# Patient Record
Sex: MALE | Race: WHITE | NOT HISPANIC OR LATINO | URBAN - METROPOLITAN AREA
[De-identification: names, ages, dates, MRNs, and addresses within clinical notes are randomized per-mention and may not be internally consistent; named-entity substitution may affect disease eponyms.]

---

## 2020-01-16 ENCOUNTER — INPATIENT (INPATIENT)
Facility: HOSPITAL | Age: 60
LOS: 5 days | Discharge: HOME | DRG: 872 | End: 2020-01-22
Attending: FAMILY MEDICINE | Admitting: FAMILY MEDICINE
Payer: COMMERCIAL

## 2020-01-16 VITALS
WEIGHT: 220.02 LBS | DIASTOLIC BLOOD PRESSURE: 76 MMHG | HEART RATE: 103 BPM | TEMPERATURE: 100 F | RESPIRATION RATE: 18 BRPM | SYSTOLIC BLOOD PRESSURE: 132 MMHG | OXYGEN SATURATION: 96 %

## 2020-01-16 DIAGNOSIS — M54.9 DORSALGIA, UNSPECIFIED: ICD-10-CM

## 2020-01-16 DIAGNOSIS — Z29.9 ENCOUNTER FOR PROPHYLACTIC MEASURES, UNSPECIFIED: ICD-10-CM

## 2020-01-16 DIAGNOSIS — R50.9 FEVER, UNSPECIFIED: ICD-10-CM

## 2020-01-16 LAB
ALBUMIN SERPL ELPH-MCNC: 4 G/DL — SIGNIFICANT CHANGE UP (ref 3.3–5)
ALP SERPL-CCNC: 70 U/L — SIGNIFICANT CHANGE UP (ref 40–120)
ALT FLD-CCNC: 34 U/L — SIGNIFICANT CHANGE UP (ref 12–78)
ANION GAP SERPL CALC-SCNC: 8 MMOL/L — SIGNIFICANT CHANGE UP (ref 5–17)
APPEARANCE UR: CLEAR — SIGNIFICANT CHANGE UP
AST SERPL-CCNC: 28 U/L — SIGNIFICANT CHANGE UP (ref 15–37)
BASOPHILS # BLD AUTO: 0 K/UL — SIGNIFICANT CHANGE UP (ref 0–0.2)
BASOPHILS NFR BLD AUTO: 0 % — SIGNIFICANT CHANGE UP (ref 0–2)
BILIRUB SERPL-MCNC: 1.3 MG/DL — HIGH (ref 0.2–1.2)
BILIRUB UR-MCNC: NEGATIVE — SIGNIFICANT CHANGE UP
BUN SERPL-MCNC: 13 MG/DL — SIGNIFICANT CHANGE UP (ref 7–23)
CALCIUM SERPL-MCNC: 9.8 MG/DL — SIGNIFICANT CHANGE UP (ref 8.5–10.1)
CHLORIDE SERPL-SCNC: 101 MMOL/L — SIGNIFICANT CHANGE UP (ref 96–108)
CO2 SERPL-SCNC: 27 MMOL/L — SIGNIFICANT CHANGE UP (ref 22–31)
COLOR SPEC: YELLOW — SIGNIFICANT CHANGE UP
CREAT SERPL-MCNC: 1.1 MG/DL — SIGNIFICANT CHANGE UP (ref 0.5–1.3)
DIFF PNL FLD: ABNORMAL
EOSINOPHIL # BLD AUTO: 0 K/UL — SIGNIFICANT CHANGE UP (ref 0–0.5)
EOSINOPHIL NFR BLD AUTO: 0 % — SIGNIFICANT CHANGE UP (ref 0–6)
GLUCOSE SERPL-MCNC: 109 MG/DL — HIGH (ref 70–99)
GLUCOSE UR QL: NEGATIVE — SIGNIFICANT CHANGE UP
HCT VFR BLD CALC: 42 % — SIGNIFICANT CHANGE UP (ref 39–50)
HGB BLD-MCNC: 14.2 G/DL — SIGNIFICANT CHANGE UP (ref 13–17)
KETONES UR-MCNC: ABNORMAL
LACTATE SERPL-SCNC: 0.9 MMOL/L — SIGNIFICANT CHANGE UP (ref 0.7–2)
LEUKOCYTE ESTERASE UR-ACNC: NEGATIVE — SIGNIFICANT CHANGE UP
LIDOCAIN IGE QN: 92 U/L — SIGNIFICANT CHANGE UP (ref 73–393)
LYMPHOCYTES # BLD AUTO: 1.17 K/UL — SIGNIFICANT CHANGE UP (ref 1–3.3)
LYMPHOCYTES # BLD AUTO: 9 % — LOW (ref 13–44)
MCHC RBC-ENTMCNC: 29.3 PG — SIGNIFICANT CHANGE UP (ref 27–34)
MCHC RBC-ENTMCNC: 33.8 GM/DL — SIGNIFICANT CHANGE UP (ref 32–36)
MCV RBC AUTO: 86.6 FL — SIGNIFICANT CHANGE UP (ref 80–100)
MONOCYTES # BLD AUTO: 1.69 K/UL — HIGH (ref 0–0.9)
MONOCYTES NFR BLD AUTO: 13 % — SIGNIFICANT CHANGE UP (ref 2–14)
NEUTROPHILS # BLD AUTO: 9.48 K/UL — HIGH (ref 1.8–7.4)
NEUTROPHILS NFR BLD AUTO: 65 % — SIGNIFICANT CHANGE UP (ref 43–77)
NITRITE UR-MCNC: NEGATIVE — SIGNIFICANT CHANGE UP
NRBC # BLD: SIGNIFICANT CHANGE UP /100 WBCS (ref 0–0)
PH UR: 5 — SIGNIFICANT CHANGE UP (ref 5–8)
PLATELET # BLD AUTO: 208 K/UL — SIGNIFICANT CHANGE UP (ref 150–400)
POTASSIUM SERPL-MCNC: 4.2 MMOL/L — SIGNIFICANT CHANGE UP (ref 3.5–5.3)
POTASSIUM SERPL-SCNC: 4.2 MMOL/L — SIGNIFICANT CHANGE UP (ref 3.5–5.3)
PROT SERPL-MCNC: 8.4 G/DL — HIGH (ref 6–8.3)
PROT UR-MCNC: 30 MG/DL
RBC # BLD: 4.85 M/UL — SIGNIFICANT CHANGE UP (ref 4.2–5.8)
RBC # FLD: 12.6 % — SIGNIFICANT CHANGE UP (ref 10.3–14.5)
SODIUM SERPL-SCNC: 136 MMOL/L — SIGNIFICANT CHANGE UP (ref 135–145)
SP GR SPEC: 1 — LOW (ref 1.01–1.02)
UROBILINOGEN FLD QL: NEGATIVE — SIGNIFICANT CHANGE UP
WBC # BLD: 12.98 K/UL — HIGH (ref 3.8–10.5)
WBC # FLD AUTO: 12.98 K/UL — HIGH (ref 3.8–10.5)

## 2020-01-16 PROCEDURE — 74177 CT ABD & PELVIS W/CONTRAST: CPT | Mod: 26

## 2020-01-16 PROCEDURE — 99285 EMERGENCY DEPT VISIT HI MDM: CPT

## 2020-01-16 RX ORDER — SODIUM CHLORIDE 9 MG/ML
1000 INJECTION INTRAMUSCULAR; INTRAVENOUS; SUBCUTANEOUS ONCE
Refills: 0 | Status: COMPLETED | OUTPATIENT
Start: 2020-01-16 | End: 2020-01-16

## 2020-01-16 RX ORDER — ACETAMINOPHEN 500 MG
650 TABLET ORAL EVERY 6 HOURS
Refills: 0 | Status: DISCONTINUED | OUTPATIENT
Start: 2020-01-16 | End: 2020-01-22

## 2020-01-16 RX ORDER — TELMISARTAN 20 MG/1
1 TABLET ORAL
Qty: 0 | Refills: 0 | DISCHARGE

## 2020-01-16 RX ORDER — VANCOMYCIN HCL 1 G
1000 VIAL (EA) INTRAVENOUS EVERY 12 HOURS
Refills: 0 | Status: DISCONTINUED | OUTPATIENT
Start: 2020-01-16 | End: 2020-01-16

## 2020-01-16 RX ORDER — MORPHINE SULFATE 50 MG/1
2 CAPSULE, EXTENDED RELEASE ORAL EVERY 6 HOURS
Refills: 0 | Status: DISCONTINUED | OUTPATIENT
Start: 2020-01-16 | End: 2020-01-20

## 2020-01-16 RX ORDER — TRAMADOL HYDROCHLORIDE 50 MG/1
25 TABLET ORAL EVERY 6 HOURS
Refills: 0 | Status: DISCONTINUED | OUTPATIENT
Start: 2020-01-16 | End: 2020-01-20

## 2020-01-16 RX ORDER — LIDOCAINE 4 G/100G
1 CREAM TOPICAL EVERY 24 HOURS
Refills: 0 | Status: DISCONTINUED | OUTPATIENT
Start: 2020-01-16 | End: 2020-01-22

## 2020-01-16 RX ORDER — HEPARIN SODIUM 5000 [USP'U]/ML
5000 INJECTION INTRAVENOUS; SUBCUTANEOUS EVERY 12 HOURS
Refills: 0 | Status: DISCONTINUED | OUTPATIENT
Start: 2020-01-16 | End: 2020-01-16

## 2020-01-16 RX ORDER — OXYCODONE HYDROCHLORIDE 5 MG/1
5 TABLET ORAL EVERY 6 HOURS
Refills: 0 | Status: DISCONTINUED | OUTPATIENT
Start: 2020-01-16 | End: 2020-01-20

## 2020-01-16 RX ORDER — SIMVASTATIN 20 MG/1
20 TABLET, FILM COATED ORAL AT BEDTIME
Refills: 0 | Status: DISCONTINUED | OUTPATIENT
Start: 2020-01-16 | End: 2020-01-21

## 2020-01-16 RX ORDER — HEPARIN SODIUM 5000 [USP'U]/ML
5000 INJECTION INTRAVENOUS; SUBCUTANEOUS EVERY 8 HOURS
Refills: 0 | Status: DISCONTINUED | OUTPATIENT
Start: 2020-01-16 | End: 2020-01-22

## 2020-01-16 RX ORDER — ONDANSETRON 8 MG/1
4 TABLET, FILM COATED ORAL ONCE
Refills: 0 | Status: COMPLETED | OUTPATIENT
Start: 2020-01-16 | End: 2020-01-16

## 2020-01-16 RX ORDER — MORPHINE SULFATE 50 MG/1
4 CAPSULE, EXTENDED RELEASE ORAL ONCE
Refills: 0 | Status: DISCONTINUED | OUTPATIENT
Start: 2020-01-16 | End: 2020-01-16

## 2020-01-16 RX ORDER — VANCOMYCIN HCL 1 G
1500 VIAL (EA) INTRAVENOUS EVERY 12 HOURS
Refills: 0 | Status: DISCONTINUED | OUTPATIENT
Start: 2020-01-16 | End: 2020-01-18

## 2020-01-16 RX ORDER — SIMVASTATIN 20 MG/1
0 TABLET, FILM COATED ORAL
Qty: 0 | Refills: 0 | DISCHARGE

## 2020-01-16 RX ORDER — METOPROLOL TARTRATE 50 MG
0 TABLET ORAL
Qty: 0 | Refills: 0 | DISCHARGE

## 2020-01-16 RX ORDER — KETOROLAC TROMETHAMINE 30 MG/ML
15 SYRINGE (ML) INJECTION ONCE
Refills: 0 | Status: DISCONTINUED | OUTPATIENT
Start: 2020-01-16 | End: 2020-01-16

## 2020-01-16 RX ORDER — ATORVASTATIN CALCIUM 80 MG/1
1 TABLET, FILM COATED ORAL
Qty: 0 | Refills: 0 | DISCHARGE

## 2020-01-16 RX ORDER — LACTOBACILLUS ACIDOPHILUS 100MM CELL
1 CAPSULE ORAL THREE TIMES A DAY
Refills: 0 | Status: DISCONTINUED | OUTPATIENT
Start: 2020-01-16 | End: 2020-01-22

## 2020-01-16 RX ORDER — CIPROFLOXACIN LACTATE 400MG/40ML
400 VIAL (ML) INTRAVENOUS ONCE
Refills: 0 | Status: COMPLETED | OUTPATIENT
Start: 2020-01-16 | End: 2020-01-16

## 2020-01-16 RX ORDER — CYCLOBENZAPRINE HYDROCHLORIDE 10 MG/1
5 TABLET, FILM COATED ORAL THREE TIMES A DAY
Refills: 0 | Status: DISCONTINUED | OUTPATIENT
Start: 2020-01-16 | End: 2020-01-21

## 2020-01-16 RX ORDER — METOPROLOL TARTRATE 50 MG
25 TABLET ORAL DAILY
Refills: 0 | Status: DISCONTINUED | OUTPATIENT
Start: 2020-01-16 | End: 2020-01-17

## 2020-01-16 RX ORDER — METOPROLOL TARTRATE 50 MG
1 TABLET ORAL
Qty: 0 | Refills: 0 | DISCHARGE

## 2020-01-16 RX ADMIN — SIMVASTATIN 20 MILLIGRAM(S): 20 TABLET, FILM COATED ORAL at 23:12

## 2020-01-16 RX ADMIN — MORPHINE SULFATE 4 MILLIGRAM(S): 50 CAPSULE, EXTENDED RELEASE ORAL at 17:45

## 2020-01-16 RX ADMIN — Medication 15 MILLIGRAM(S): at 16:34

## 2020-01-16 RX ADMIN — Medication 15 MILLIGRAM(S): at 15:14

## 2020-01-16 RX ADMIN — HEPARIN SODIUM 5000 UNIT(S): 5000 INJECTION INTRAVENOUS; SUBCUTANEOUS at 23:12

## 2020-01-16 RX ADMIN — MORPHINE SULFATE 4 MILLIGRAM(S): 50 CAPSULE, EXTENDED RELEASE ORAL at 17:59

## 2020-01-16 RX ADMIN — SODIUM CHLORIDE 1000 MILLILITER(S): 9 INJECTION INTRAMUSCULAR; INTRAVENOUS; SUBCUTANEOUS at 16:34

## 2020-01-16 RX ADMIN — Medication 1 TABLET(S): at 23:12

## 2020-01-16 RX ADMIN — MORPHINE SULFATE 4 MILLIGRAM(S): 50 CAPSULE, EXTENDED RELEASE ORAL at 16:42

## 2020-01-16 RX ADMIN — Medication 200 MILLIGRAM(S): at 17:59

## 2020-01-16 RX ADMIN — CYCLOBENZAPRINE HYDROCHLORIDE 5 MILLIGRAM(S): 10 TABLET, FILM COATED ORAL at 20:20

## 2020-01-16 RX ADMIN — Medication 400 MILLIGRAM(S): at 19:00

## 2020-01-16 RX ADMIN — LIDOCAINE 1 PATCH: 4 CREAM TOPICAL at 20:21

## 2020-01-16 RX ADMIN — SODIUM CHLORIDE 1000 MILLILITER(S): 9 INJECTION INTRAMUSCULAR; INTRAVENOUS; SUBCUTANEOUS at 15:15

## 2020-01-16 RX ADMIN — ONDANSETRON 4 MILLIGRAM(S): 8 TABLET, FILM COATED ORAL at 15:14

## 2020-01-16 NOTE — ED ADULT NURSE NOTE - OBJECTIVE STATEMENT
This is a 58 y/o male received ambulatory AXO&4 c/o left sided plan associated with  urinary hesitancy and dysuria. He denies any fever, chills, vomiting, diarrhea. Abdomen soft nontender nondistended. Plan of care explained to patient will monitor support and safety maintained.

## 2020-01-16 NOTE — ED PROVIDER NOTE - CLINICAL SUMMARY MEDICAL DECISION MAKING FREE TEXT BOX
low back pain with fever and hematuria, CT low back pain with fever and hematuria, CT shows enlarged prostate and L4/L5 disc herniation, complicated by fever, will need MRI to r/o discitis

## 2020-01-16 NOTE — ED ADULT NURSE NOTE - PMH
Hyperlipidemia (ICD9 272.4)    Obesity (ICD9 278.00)    Rotator Cuff Syndrome of Shoulder and Allied Disorders (ICD9 726.10)

## 2020-01-16 NOTE — H&P ADULT - NSICDXPASTMEDICALHX_GEN_ALL_CORE_FT
PAST MEDICAL HISTORY:  Hyperlipidemia (ICD9 272.4)     Obesity (ICD9 278.00)     Rotator Cuff Syndrome of Shoulder and Allied Disorders (ICD9 726.10)

## 2020-01-16 NOTE — ED PROVIDER NOTE - OBJECTIVE STATEMENT
pt with low back pain for last 2 days, pain with movement, saw PMD today, noted that he had a fever and blood in the urine, sent pt in for further evaluation.  pt denies any n/v/d, denies any abdominal pain.  reports only very low (sacral) midline back pain.

## 2020-01-16 NOTE — H&P ADULT - NSHPLABSRESULTS_GEN_ALL_CORE
136  |  101  |  13  ----------------------------<  109<H>  4.2   |  27  |  1.10    Ca    9.8      2020 15:28    TPro  8.4<H>  /  Alb  4.0  /  TBili  1.3<H>  /  DBili  x   /  AST  28  /  ALT  34  /  AlkPhos  70                              14.2   12.98 )-----------( 208      ( 2020 15:28 )             42.0             LIVER FUNCTIONS - ( 2020 15:28 )  Alb: 4.0 g/dL / Pro: 8.4 g/dL / ALK PHOS: 70 U/L / ALT: 34 U/L / AST: 28 U/L / GGT: x                 Urinalysis Basic - ( 2020 18:08 )    Color: Yellow / Appearance: Clear / S.005 / pH: x  Gluc: x / Ketone: Trace  / Bili: Negative / Urobili: Negative   Blood: x / Protein: 30 mg/dL / Nitrite: Negative   Leuk Esterase: Negative / RBC: 3-5 /HPF / WBC 0-2   Sq Epi: x / Non Sq Epi: Few / Bacteria: Occasional

## 2020-01-16 NOTE — H&P ADULT - NSHPPHYSICALEXAM_GEN_ALL_CORE
Physical Examination: Gen:  alert, awake, no acute distress  	HEENT:  atraumatic head, airway clear, pupils equal and round  	CV:  rrr, nl S1, S2, no m/r/g  	Pulm:  lungs CTA b/l  	Abd: s/nt/nd, +BS  	Ext:  moving all extremities  	Neuro:  grossly intact, no focal deficits  	Spine: no spinal tenderness  	Skin:  clear, dry, intact  Psych: AOx3, normal affect, no apparent risk to self or others

## 2020-01-16 NOTE — PHARMACOTHERAPY INTERVENTION NOTE - COMMENTS
Patient ordered for heparin 5000 units SQ Q12 hours for dvt ppx and vancomycin 1g Q12 hours. Patient weighs ~100kg and CrCl >50. Recommended increasing heparin to Q8 hours and vancomycin to 1500mg Q12 hours; provider agreed.

## 2020-01-16 NOTE — ED PROVIDER NOTE - PHYSICAL EXAMINATION
Gen:  alert, awake, no acute distress  HEENT:  atraumatic head, airway clear, pupils equal and round  CV:  rrr, nl S1, S2, no m/r/g  Pulm:  lungs CTA b/l  Abd: s/nt/nd, +BS  Ext:  moving all extremities  Neuro:  grossly intact, no focal deficits  Spine: no spinal tenderness  Skin:  clear, dry, intact  Psych: AOx3, normal affect, no apparent risk to self or others

## 2020-01-16 NOTE — ED ADULT TRIAGE NOTE - CHIEF COMPLAINT QUOTE
Pt went to his PCP with  complaints of back/flank pain, and urinary hesitancy and dysuria, the PCP did a urine test which showed blood in the urine and they want him worked up here for a possible kidney stone.

## 2020-01-16 NOTE — H&P ADULT - HISTORY OF PRESENT ILLNESS
pt with low back pain for last 2 days, pain with movement, saw PMD today, noted that he had a fever and blood in the urine, sent pt in for further evaluation.  pt denies any n/v/d, denies any abdominal pain.  reports only very low (sacral) midline back pain.  In ER patient was found to have disc herniation.  Patient is being admitted for further work up and treatment.

## 2020-01-16 NOTE — ED PROVIDER NOTE - CARE PLAN
Principal Discharge DX:	Back pain  Secondary Diagnosis:	Fever  Secondary Diagnosis:	Prostatic disease

## 2020-01-17 DIAGNOSIS — R78.81 BACTEREMIA: ICD-10-CM

## 2020-01-17 DIAGNOSIS — M86.10 OTHER ACUTE OSTEOMYELITIS, UNSPECIFIED SITE: ICD-10-CM

## 2020-01-17 LAB
-  STREPTOCOCCUS SP. (NOT GRP A, B OR S PNEUMONIAE): SIGNIFICANT CHANGE UP
CULTURE RESULTS: NO GROWTH — SIGNIFICANT CHANGE UP
FLU A RESULT: SIGNIFICANT CHANGE UP
FLU A RESULT: SIGNIFICANT CHANGE UP
FLUAV AG NPH QL: SIGNIFICANT CHANGE UP
FLUBV AG NPH QL: SIGNIFICANT CHANGE UP
GRAM STN FLD: SIGNIFICANT CHANGE UP
HBA1C BLD-MCNC: 6.1 % — HIGH (ref 4–5.6)
HCT VFR BLD CALC: 39.5 % — SIGNIFICANT CHANGE UP (ref 39–50)
HCV AB S/CO SERPL IA: 0.12 S/CO — SIGNIFICANT CHANGE UP (ref 0–0.99)
HCV AB SERPL-IMP: SIGNIFICANT CHANGE UP
HGB BLD-MCNC: 13.1 G/DL — SIGNIFICANT CHANGE UP (ref 13–17)
MCHC RBC-ENTMCNC: 28.9 PG — SIGNIFICANT CHANGE UP (ref 27–34)
MCHC RBC-ENTMCNC: 33.2 GM/DL — SIGNIFICANT CHANGE UP (ref 32–36)
MCV RBC AUTO: 87.2 FL — SIGNIFICANT CHANGE UP (ref 80–100)
METHOD TYPE: SIGNIFICANT CHANGE UP
NRBC # BLD: 0 /100 WBCS — SIGNIFICANT CHANGE UP (ref 0–0)
PLATELET # BLD AUTO: 180 K/UL — SIGNIFICANT CHANGE UP (ref 150–400)
RBC # BLD: 4.53 M/UL — SIGNIFICANT CHANGE UP (ref 4.2–5.8)
RBC # FLD: 12.6 % — SIGNIFICANT CHANGE UP (ref 10.3–14.5)
RSV RESULT: SIGNIFICANT CHANGE UP
RSV RNA RESP QL NAA+PROBE: SIGNIFICANT CHANGE UP
SPECIMEN SOURCE: SIGNIFICANT CHANGE UP
WBC # BLD: 9.48 K/UL — SIGNIFICANT CHANGE UP (ref 3.8–10.5)
WBC # FLD AUTO: 9.48 K/UL — SIGNIFICANT CHANGE UP (ref 3.8–10.5)

## 2020-01-17 PROCEDURE — 72158 MRI LUMBAR SPINE W/O & W/DYE: CPT | Mod: 26

## 2020-01-17 RX ORDER — CEFEPIME 1 G/1
2000 INJECTION, POWDER, FOR SOLUTION INTRAMUSCULAR; INTRAVENOUS EVERY 8 HOURS
Refills: 0 | Status: DISCONTINUED | OUTPATIENT
Start: 2020-01-17 | End: 2020-01-18

## 2020-01-17 RX ORDER — METOPROLOL TARTRATE 50 MG
25 TABLET ORAL DAILY
Refills: 0 | Status: DISCONTINUED | OUTPATIENT
Start: 2020-01-17 | End: 2020-01-22

## 2020-01-17 RX ORDER — ONDANSETRON 8 MG/1
4 TABLET, FILM COATED ORAL ONCE
Refills: 0 | Status: COMPLETED | OUTPATIENT
Start: 2020-01-17 | End: 2020-01-17

## 2020-01-17 RX ORDER — HYDROMORPHONE HYDROCHLORIDE 2 MG/ML
0.5 INJECTION INTRAMUSCULAR; INTRAVENOUS; SUBCUTANEOUS
Refills: 0 | Status: DISCONTINUED | OUTPATIENT
Start: 2020-01-17 | End: 2020-01-22

## 2020-01-17 RX ORDER — CEFEPIME 1 G/1
2000 INJECTION, POWDER, FOR SOLUTION INTRAMUSCULAR; INTRAVENOUS ONCE
Refills: 0 | Status: COMPLETED | OUTPATIENT
Start: 2020-01-17 | End: 2020-01-17

## 2020-01-17 RX ORDER — CEFEPIME 1 G/1
INJECTION, POWDER, FOR SOLUTION INTRAMUSCULAR; INTRAVENOUS
Refills: 0 | Status: DISCONTINUED | OUTPATIENT
Start: 2020-01-17 | End: 2020-01-18

## 2020-01-17 RX ADMIN — HEPARIN SODIUM 5000 UNIT(S): 5000 INJECTION INTRAVENOUS; SUBCUTANEOUS at 07:32

## 2020-01-17 RX ADMIN — ONDANSETRON 4 MILLIGRAM(S): 8 TABLET, FILM COATED ORAL at 06:33

## 2020-01-17 RX ADMIN — Medication 650 MILLIGRAM(S): at 01:52

## 2020-01-17 RX ADMIN — HEPARIN SODIUM 5000 UNIT(S): 5000 INJECTION INTRAVENOUS; SUBCUTANEOUS at 22:15

## 2020-01-17 RX ADMIN — Medication 1 TABLET(S): at 13:10

## 2020-01-17 RX ADMIN — Medication 1 TABLET(S): at 22:15

## 2020-01-17 RX ADMIN — CEFEPIME 100 MILLIGRAM(S): 1 INJECTION, POWDER, FOR SOLUTION INTRAMUSCULAR; INTRAVENOUS at 22:15

## 2020-01-17 RX ADMIN — Medication 1 TABLET(S): at 06:33

## 2020-01-17 RX ADMIN — Medication 650 MILLIGRAM(S): at 22:15

## 2020-01-17 RX ADMIN — MORPHINE SULFATE 2 MILLIGRAM(S): 50 CAPSULE, EXTENDED RELEASE ORAL at 18:54

## 2020-01-17 RX ADMIN — MORPHINE SULFATE 2 MILLIGRAM(S): 50 CAPSULE, EXTENDED RELEASE ORAL at 02:10

## 2020-01-17 RX ADMIN — Medication 300 MILLIGRAM(S): at 18:31

## 2020-01-17 RX ADMIN — Medication 650 MILLIGRAM(S): at 23:15

## 2020-01-17 RX ADMIN — OXYCODONE HYDROCHLORIDE 5 MILLIGRAM(S): 5 TABLET ORAL at 06:33

## 2020-01-17 RX ADMIN — LIDOCAINE 1 PATCH: 4 CREAM TOPICAL at 09:05

## 2020-01-17 RX ADMIN — MORPHINE SULFATE 2 MILLIGRAM(S): 50 CAPSULE, EXTENDED RELEASE ORAL at 01:52

## 2020-01-17 RX ADMIN — Medication 25 MILLIGRAM(S): at 06:33

## 2020-01-17 RX ADMIN — MORPHINE SULFATE 2 MILLIGRAM(S): 50 CAPSULE, EXTENDED RELEASE ORAL at 08:34

## 2020-01-17 RX ADMIN — Medication 1 MILLIGRAM(S): at 16:32

## 2020-01-17 RX ADMIN — Medication 650 MILLIGRAM(S): at 09:03

## 2020-01-17 RX ADMIN — HEPARIN SODIUM 5000 UNIT(S): 5000 INJECTION INTRAVENOUS; SUBCUTANEOUS at 15:50

## 2020-01-17 RX ADMIN — Medication 300 MILLIGRAM(S): at 06:33

## 2020-01-17 RX ADMIN — Medication 650 MILLIGRAM(S): at 02:30

## 2020-01-17 RX ADMIN — CYCLOBENZAPRINE HYDROCHLORIDE 5 MILLIGRAM(S): 10 TABLET, FILM COATED ORAL at 22:15

## 2020-01-17 RX ADMIN — CEFEPIME 100 MILLIGRAM(S): 1 INJECTION, POWDER, FOR SOLUTION INTRAMUSCULAR; INTRAVENOUS at 11:52

## 2020-01-17 RX ADMIN — Medication 650 MILLIGRAM(S): at 10:39

## 2020-01-17 RX ADMIN — SIMVASTATIN 20 MILLIGRAM(S): 20 TABLET, FILM COATED ORAL at 22:15

## 2020-01-17 RX ADMIN — MORPHINE SULFATE 2 MILLIGRAM(S): 50 CAPSULE, EXTENDED RELEASE ORAL at 19:10

## 2020-01-17 RX ADMIN — LIDOCAINE 1 PATCH: 4 CREAM TOPICAL at 22:14

## 2020-01-17 RX ADMIN — Medication 25 MILLIGRAM(S): at 18:31

## 2020-01-17 RX ADMIN — OXYCODONE HYDROCHLORIDE 5 MILLIGRAM(S): 5 TABLET ORAL at 07:33

## 2020-01-17 NOTE — PROVIDER CONTACT NOTE (CRITICAL VALUE NOTIFICATION) - TEST AND RESULT REPORTED:
blood culture preliminary from 1/16/20 growth in anaerobic  and aerobic  gram positive cocci in pairs and chains

## 2020-01-17 NOTE — CONSULT NOTE ADULT - ASSESSMENT
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

## 2020-01-17 NOTE — ED ADULT NURSE REASSESSMENT NOTE - NS ED NURSE REASSESS COMMENT FT1
MD Alvarez contacted regarding patient not being able to tolerate MRI procedure. as per MD Alvarez, patient will be rescheduled and premedicated prior to MRI.  patient made aware and agrees.
Report given to Francisco KLINE
Received pt in bed alert and oriented from Yonathan.  Pt awaiting bed.  Complaints of pain treated with percocet this am.  Ongoing nursing care and safety maintained.

## 2020-01-17 NOTE — CONSULT NOTE ADULT - PROBLEM SELECTOR RECOMMENDATION 2
-have discussed with patient the risk of adverse outcomes with delayed diagnosis and will discuss further with patient and wife.    Thank you for consulting us and involving us in the management of this most interesting and challenging case.     We will follow along in the care of this patient. -have discussed with patient the risk of adverse outcomes with delayed diagnosis and will discuss further with patient and wife.

## 2020-01-17 NOTE — CONSULT NOTE ADULT - PROBLEM SELECTOR RECOMMENDATION 9
with localization to the back and fever in this man over the age of 52 concerns for osteo, epidural abscess and do concur with MRI and blood cultures. Decision is regarding decision to start/continue abx prior to obtaining definitive diagnosis but with start of abx already will recommend continue vanco and will add cefepime.  Will also add  follow results of imaging with further recs to follow.

## 2020-01-17 NOTE — CONSULT NOTE ADULT - PROBLEM SELECTOR RECOMMENDATION 3
highest suspicion as discussed above.    Thank you for consulting us and involving us in the management of this most interesting and challenging case.     We will follow along in the care of this patient.

## 2020-01-17 NOTE — CONSULT NOTE ADULT - SUBJECTIVE AND OBJECTIVE BOX
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  pt denies any n/v/d, denies any abdominal pain.  reports only very low (sacral) midline back pain.      PAST MEDICAL & SURGICAL HISTORY:  Obesity (ICD9 278.00)  Rotator Cuff Syndrome of Shoulder and Allied Disorders (ICD9 726.10)  Hyperlipidemia (ICD9 272.4)  Rt wrist surgery   S/P Shoulder Surgery (ICD9 V45.89) ,       Antimicrobials  vancomycin  IVPB 1500 milliGRAM(s) IV Intermittent every 12 hours      Immunological      Other  acetaminophen   Tablet .. 650 milliGRAM(s) Oral every 6 hours PRN  cyclobenzaprine 5 milliGRAM(s) Oral three times a day PRN  heparin  Injectable 5000 Unit(s) SubCutaneous every 8 hours  lactobacillus acidophilus 1 Tablet(s) Oral three times a day  lidocaine   Patch 1 Patch Transdermal every 24 hours  metoprolol succinate ER 25 milliGRAM(s) Oral daily  morphine  - Injectable 2 milliGRAM(s) IV Push every 6 hours PRN  oxyCODONE    IR 5 milliGRAM(s) Oral every 6 hours PRN  simvastatin 20 milliGRAM(s) Oral at bedtime  traMADol 25 milliGRAM(s) Oral every 6 hours PRN      Allergies    No Known Allergies    Intolerances      SOCIAL HISTORY:  · Tobacco Usage - no (2020 18:01)      FAMILY HISTORY:      ROS:    EYES:  Negative  blurry vision or double vision  GASTROINTESTINAL:  Negative for nausea, vomiting, diarrhea  -otherwise negative except for subjective    Vital Signs Last 24 Hrs  T(C): 39.4 (2020 08:57), Max: 39.4 (2020 08:57)  T(F): 102.9 (2020 08:57), Max: 102.9 (2020 08:57)  HR: 99 (2020 08:57) (88 - 104)  BP: 119/67 (2020 08:57) (119/67 - 139/76)  BP(mean): --  RR: 18 (2020 08:57) (15 - 18)  SpO2: 93% (2020 08:57) (93% - 99%)    PE:  WDWN in no distress  HEENT:  NC, PERRL, sclerae anicteric, conjunctivae clear, EOMI.  Sinuses nontender, no nasal exudate.  No buccal or pharyngeal lesions, erythema or exudate  Neck:  Supple, no adenopathy  Lungs:  No accessory muscle use, bilaterally clear to auscultation  Cor:  RRR, S1, S2, no murmur appreciated  Abd:  Symmetric, normoactive BS.  Soft, nontender, no masses, guarding or rebound.  Liver and spleen not enlarged  Extrem:  No cyanosis or edema  Skin:  No rashes.  Neuro: grossly intact  Musc: no point tenderness but localized pain in lower sacral spine midline, pain with movement    LABS:                        13.1   9.48  )-----------( 180      ( 2020 04:49 )             39.5       WBC Count: 9.48 K/uL (20 @ 04:49)  WBC Count: 12.98 K/uL (20 @ 15:28)          136  |  101  |  13  ----------------------------<  109<H>  4.2   |  27  |  1.10    Ca    9.8      2020 15:28    TPro  8.4<H>  /  Alb  4.0  /  TBili  1.3<H>  /  DBili  x   /  AST  28  /  ALT  34  /  AlkPhos  70        Creatinine, Serum: 1.10 mg/dL (20 @ 15:28)      Urinalysis Basic - ( 2020 18:08 )    Color: Yellow / Appearance: Clear / S.005 / pH: x  Gluc: x / Ketone: Trace  / Bili: Negative / Urobili: Negative   Blood: x / Protein: 30 mg/dL / Nitrite: Negative   Leuk Esterase: Negative / RBC: 3-5 /HPF / WBC 0-2   Sq Epi: x / Non Sq Epi: Few / Bacteria: Occasional      MICROBIOLOGY:      RADIOLOGY & ADDITIONAL STUDIES:    --< from: CT Abdomen and Pelvis w/ IV Cont (20 @ 16:37) >    EXAM:  CT ABDOMEN AND PELVIS IC                            PROCEDURE DATE:  2020          INTERPRETATION:  CLINICAL INFORMATION: Low back pain, hematuria.    COMPARISON: CT scan abdomen pelvis 2013.    PROCEDURE:   CT of the Abdomen and Pelvis was performed with intravenous contrast.   Intravenous contrast: 90 ml Omnipaque 350. 10 ml discarded.  Oral contrast: None.  Sagittal and coronal reformats were performed.    FINDINGS:    LOWER CHEST: Stable appearance of the 4 mm nodule rightmiddle lobe.    LIVER: Fatty infiltration.  BILE DUCTS: Normal caliber.  GALLBLADDER: Within normal limits.  SPLEEN: Mild splenomegaly.  PANCREAS: Within normal limits.  ADRENALS: Within normal limits.  KIDNEYS/URETERS:   15 mm cyst upper pole right kidney. 11 mm cyst upper pole left kidney, and 13 mm cyst interpolar left kidney.  There are smaller, indeterminate hypodense renal lesions at the bilateral lower poles.  No hydroureteronephrosis or obstructing ureteral calculus noted.    BLADDER: Within normal limits.  REPRODUCTIVE ORGANS: Enlarged prostate gland with impression on the bladder base.    BOWEL: No bowel obstruction. Appendix not visualized on this exam.  PERITONEUM: No ascites.  VESSELS: Atherosclerotic calcification of the abdominal aorta.  RETROPERITONEUM/LYMPH NODES: No lymphadenopathy.    ABDOMINAL WALL: Small fat-containing periumbilical hernia.  Small fat-containing left inguinal hernia.  BONES:   Multilevel degenerative changes of the lumbar spine.  There is diffuse discbulging L4-5 compressing the ventral thecal sac.  Evaluation for deep soft tissue infection, including spondylodiscitis is limited on CT scan.  If this is a clinical concern, recommend further evaluation with MRI if there are no clinical contraindications.    IMPRESSION:     Bilateral renal cysts, with small indeterminate hypodense renal lesions.  These can be further characterized by nonemergent MRI if there are no clinical contraindications.    Prostatomegaly, correlate clinically.    Other findings as discussed above.

## 2020-01-17 NOTE — PHYSICAL THERAPY INITIAL EVALUATION ADULT - PERTINENT HX OF CURRENT PROBLEM, REHAB EVAL
Pt. with low back pain for last 2 days, pain with movement, saw PMD today, noted that he had a fever and blood in the urine, sent pt in for further evaluation.  pt denies any n/v/d, denies any abdominal pain.  reports only very low (sacral) midline back pain.

## 2020-01-17 NOTE — PROGRESS NOTE ADULT - ASSESSMENT
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

## 2020-01-18 LAB
ANION GAP SERPL CALC-SCNC: 8 MMOL/L — SIGNIFICANT CHANGE UP (ref 5–17)
BUN SERPL-MCNC: 20 MG/DL — SIGNIFICANT CHANGE UP (ref 7–23)
CALCIUM SERPL-MCNC: 8.5 MG/DL — SIGNIFICANT CHANGE UP (ref 8.5–10.1)
CHLORIDE SERPL-SCNC: 99 MMOL/L — SIGNIFICANT CHANGE UP (ref 96–108)
CO2 SERPL-SCNC: 27 MMOL/L — SIGNIFICANT CHANGE UP (ref 22–31)
CREAT SERPL-MCNC: 0.9 MG/DL — SIGNIFICANT CHANGE UP (ref 0.5–1.3)
CRP SERPL-MCNC: 15.59 MG/DL — HIGH (ref 0–0.4)
CULTURE RESULTS: SIGNIFICANT CHANGE UP
ERYTHROCYTE [SEDIMENTATION RATE] IN BLOOD: 49 MM/HR — HIGH (ref 0–20)
GLUCOSE SERPL-MCNC: 120 MG/DL — HIGH (ref 70–99)
GRAM STN FLD: SIGNIFICANT CHANGE UP
HCT VFR BLD CALC: 37.9 % — LOW (ref 39–50)
HGB BLD-MCNC: 12.8 G/DL — LOW (ref 13–17)
MCHC RBC-ENTMCNC: 28.6 PG — SIGNIFICANT CHANGE UP (ref 27–34)
MCHC RBC-ENTMCNC: 33.8 GM/DL — SIGNIFICANT CHANGE UP (ref 32–36)
MCV RBC AUTO: 84.8 FL — SIGNIFICANT CHANGE UP (ref 80–100)
NRBC # BLD: 0 /100 WBCS — SIGNIFICANT CHANGE UP (ref 0–0)
ORGANISM # SPEC MICROSCOPIC CNT: SIGNIFICANT CHANGE UP
ORGANISM # SPEC MICROSCOPIC CNT: SIGNIFICANT CHANGE UP
PLATELET # BLD AUTO: 160 K/UL — SIGNIFICANT CHANGE UP (ref 150–400)
POTASSIUM SERPL-MCNC: 3.5 MMOL/L — SIGNIFICANT CHANGE UP (ref 3.5–5.3)
POTASSIUM SERPL-SCNC: 3.5 MMOL/L — SIGNIFICANT CHANGE UP (ref 3.5–5.3)
RAPID RVP RESULT: SIGNIFICANT CHANGE UP
RBC # BLD: 4.47 M/UL — SIGNIFICANT CHANGE UP (ref 4.2–5.8)
RBC # FLD: 12.8 % — SIGNIFICANT CHANGE UP (ref 10.3–14.5)
SODIUM SERPL-SCNC: 134 MMOL/L — LOW (ref 135–145)
SPECIMEN SOURCE: SIGNIFICANT CHANGE UP
WBC # BLD: 8.44 K/UL — SIGNIFICANT CHANGE UP (ref 3.8–10.5)
WBC # FLD AUTO: 8.44 K/UL — SIGNIFICANT CHANGE UP (ref 3.8–10.5)

## 2020-01-18 PROCEDURE — 93306 TTE W/DOPPLER COMPLETE: CPT | Mod: 26

## 2020-01-18 RX ORDER — ONDANSETRON 8 MG/1
4 TABLET, FILM COATED ORAL EVERY 6 HOURS
Refills: 0 | Status: DISCONTINUED | OUTPATIENT
Start: 2020-01-18 | End: 2020-01-22

## 2020-01-18 RX ORDER — CEFTRIAXONE 500 MG/1
2000 INJECTION, POWDER, FOR SOLUTION INTRAMUSCULAR; INTRAVENOUS EVERY 24 HOURS
Refills: 0 | Status: DISCONTINUED | OUTPATIENT
Start: 2020-01-18 | End: 2020-01-22

## 2020-01-18 RX ADMIN — HYDROMORPHONE HYDROCHLORIDE 0.5 MILLIGRAM(S): 2 INJECTION INTRAMUSCULAR; INTRAVENOUS; SUBCUTANEOUS at 11:53

## 2020-01-18 RX ADMIN — ONDANSETRON 4 MILLIGRAM(S): 8 TABLET, FILM COATED ORAL at 20:15

## 2020-01-18 RX ADMIN — MORPHINE SULFATE 2 MILLIGRAM(S): 50 CAPSULE, EXTENDED RELEASE ORAL at 09:54

## 2020-01-18 RX ADMIN — CEFTRIAXONE 100 MILLIGRAM(S): 500 INJECTION, POWDER, FOR SOLUTION INTRAMUSCULAR; INTRAVENOUS at 16:19

## 2020-01-18 RX ADMIN — Medication 1 TABLET(S): at 06:29

## 2020-01-18 RX ADMIN — HEPARIN SODIUM 5000 UNIT(S): 5000 INJECTION INTRAVENOUS; SUBCUTANEOUS at 21:48

## 2020-01-18 RX ADMIN — LIDOCAINE 1 PATCH: 4 CREAM TOPICAL at 20:04

## 2020-01-18 RX ADMIN — ONDANSETRON 4 MILLIGRAM(S): 8 TABLET, FILM COATED ORAL at 11:44

## 2020-01-18 RX ADMIN — MORPHINE SULFATE 2 MILLIGRAM(S): 50 CAPSULE, EXTENDED RELEASE ORAL at 09:40

## 2020-01-18 RX ADMIN — Medication 1 TABLET(S): at 16:19

## 2020-01-18 RX ADMIN — HEPARIN SODIUM 5000 UNIT(S): 5000 INJECTION INTRAVENOUS; SUBCUTANEOUS at 06:29

## 2020-01-18 RX ADMIN — LIDOCAINE 1 PATCH: 4 CREAM TOPICAL at 08:49

## 2020-01-18 RX ADMIN — HYDROMORPHONE HYDROCHLORIDE 0.5 MILLIGRAM(S): 2 INJECTION INTRAMUSCULAR; INTRAVENOUS; SUBCUTANEOUS at 12:05

## 2020-01-18 RX ADMIN — SIMVASTATIN 20 MILLIGRAM(S): 20 TABLET, FILM COATED ORAL at 21:47

## 2020-01-18 RX ADMIN — Medication 1 TABLET(S): at 21:47

## 2020-01-18 RX ADMIN — MORPHINE SULFATE 2 MILLIGRAM(S): 50 CAPSULE, EXTENDED RELEASE ORAL at 20:30

## 2020-01-18 RX ADMIN — Medication 25 MILLIGRAM(S): at 06:29

## 2020-01-18 RX ADMIN — CEFEPIME 100 MILLIGRAM(S): 1 INJECTION, POWDER, FOR SOLUTION INTRAMUSCULAR; INTRAVENOUS at 05:26

## 2020-01-18 RX ADMIN — HEPARIN SODIUM 5000 UNIT(S): 5000 INJECTION INTRAVENOUS; SUBCUTANEOUS at 16:19

## 2020-01-18 RX ADMIN — LIDOCAINE 1 PATCH: 4 CREAM TOPICAL at 11:10

## 2020-01-18 RX ADMIN — MORPHINE SULFATE 2 MILLIGRAM(S): 50 CAPSULE, EXTENDED RELEASE ORAL at 20:15

## 2020-01-18 RX ADMIN — Medication 300 MILLIGRAM(S): at 06:29

## 2020-01-18 NOTE — PROGRESS NOTE ADULT - ASSESSMENT
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

## 2020-01-19 LAB
-  CEFTRIAXONE: SIGNIFICANT CHANGE UP
-  CLINDAMYCIN: SIGNIFICANT CHANGE UP
-  ERYTHROMYCIN: SIGNIFICANT CHANGE UP
-  LEVOFLOXACIN: SIGNIFICANT CHANGE UP
-  PENICILLIN: SIGNIFICANT CHANGE UP
-  VANCOMYCIN: SIGNIFICANT CHANGE UP
CULTURE RESULTS: SIGNIFICANT CHANGE UP
METHOD TYPE: SIGNIFICANT CHANGE UP
METHOD TYPE: SIGNIFICANT CHANGE UP
ORGANISM # SPEC MICROSCOPIC CNT: SIGNIFICANT CHANGE UP
SPECIMEN SOURCE: SIGNIFICANT CHANGE UP

## 2020-01-19 RX ORDER — HYDROMORPHONE HYDROCHLORIDE 2 MG/ML
0.5 INJECTION INTRAMUSCULAR; INTRAVENOUS; SUBCUTANEOUS ONCE
Refills: 0 | Status: DISCONTINUED | OUTPATIENT
Start: 2020-01-19 | End: 2020-01-19

## 2020-01-19 RX ADMIN — TRAMADOL HYDROCHLORIDE 25 MILLIGRAM(S): 50 TABLET ORAL at 17:13

## 2020-01-19 RX ADMIN — HEPARIN SODIUM 5000 UNIT(S): 5000 INJECTION INTRAVENOUS; SUBCUTANEOUS at 05:22

## 2020-01-19 RX ADMIN — ONDANSETRON 4 MILLIGRAM(S): 8 TABLET, FILM COATED ORAL at 07:31

## 2020-01-19 RX ADMIN — MORPHINE SULFATE 2 MILLIGRAM(S): 50 CAPSULE, EXTENDED RELEASE ORAL at 05:28

## 2020-01-19 RX ADMIN — SIMVASTATIN 20 MILLIGRAM(S): 20 TABLET, FILM COATED ORAL at 21:09

## 2020-01-19 RX ADMIN — Medication 1 TABLET(S): at 21:09

## 2020-01-19 RX ADMIN — HYDROMORPHONE HYDROCHLORIDE 0.5 MILLIGRAM(S): 2 INJECTION INTRAMUSCULAR; INTRAVENOUS; SUBCUTANEOUS at 23:43

## 2020-01-19 RX ADMIN — MORPHINE SULFATE 2 MILLIGRAM(S): 50 CAPSULE, EXTENDED RELEASE ORAL at 11:45

## 2020-01-19 RX ADMIN — CEFTRIAXONE 100 MILLIGRAM(S): 500 INJECTION, POWDER, FOR SOLUTION INTRAMUSCULAR; INTRAVENOUS at 17:05

## 2020-01-19 RX ADMIN — LIDOCAINE 1 PATCH: 4 CREAM TOPICAL at 20:00

## 2020-01-19 RX ADMIN — Medication 1 TABLET(S): at 05:22

## 2020-01-19 RX ADMIN — MORPHINE SULFATE 2 MILLIGRAM(S): 50 CAPSULE, EXTENDED RELEASE ORAL at 06:00

## 2020-01-19 RX ADMIN — LIDOCAINE 1 PATCH: 4 CREAM TOPICAL at 07:03

## 2020-01-19 RX ADMIN — TRAMADOL HYDROCHLORIDE 25 MILLIGRAM(S): 50 TABLET ORAL at 18:00

## 2020-01-19 RX ADMIN — ONDANSETRON 4 MILLIGRAM(S): 8 TABLET, FILM COATED ORAL at 23:35

## 2020-01-19 RX ADMIN — HEPARIN SODIUM 5000 UNIT(S): 5000 INJECTION INTRAVENOUS; SUBCUTANEOUS at 17:06

## 2020-01-19 RX ADMIN — Medication 1 TABLET(S): at 13:16

## 2020-01-19 RX ADMIN — MORPHINE SULFATE 2 MILLIGRAM(S): 50 CAPSULE, EXTENDED RELEASE ORAL at 18:40

## 2020-01-19 RX ADMIN — LIDOCAINE 1 PATCH: 4 CREAM TOPICAL at 07:30

## 2020-01-19 RX ADMIN — HEPARIN SODIUM 5000 UNIT(S): 5000 INJECTION INTRAVENOUS; SUBCUTANEOUS at 23:28

## 2020-01-19 RX ADMIN — CYCLOBENZAPRINE HYDROCHLORIDE 5 MILLIGRAM(S): 10 TABLET, FILM COATED ORAL at 23:48

## 2020-01-19 RX ADMIN — Medication 25 MILLIGRAM(S): at 05:22

## 2020-01-19 RX ADMIN — MORPHINE SULFATE 2 MILLIGRAM(S): 50 CAPSULE, EXTENDED RELEASE ORAL at 11:32

## 2020-01-19 NOTE — DIETITIAN INITIAL EVALUATION ADULT. - ENERGY NEEDS
Wt: 219.5 pounds, Ht: 72 inches (6 ft stated ht), BMI: 29.7 kg/m2 ,IBW: 178 pounds +/-10%, %IBW: 123%  no edema or pressure injuries noted

## 2020-01-19 NOTE — PROGRESS NOTE ADULT - ASSESSMENT
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

## 2020-01-19 NOTE — PROVIDER CONTACT NOTE (CRITICAL VALUE NOTIFICATION) - TEST AND RESULT REPORTED:
blood culture bottle collected on jan.17th shows growth in both aerobic and anaerobic bottle gram positive cocci in pairs and chains

## 2020-01-19 NOTE — DIETITIAN INITIAL EVALUATION ADULT. - OTHER INFO
59 year old male with PMH of HLD admitted for dorsalgia. Former collegiate athlete. Found to have herniated disc. +blood cultures. Found to have pre-diabetes (Hgba1c 6.1%).    Pt with good appetite/intake PTA. Follows regular diet - 3 meals per day with snacks. Diet recall: B - toast, coffee, L - tuna sandwich, D - burger, vegetables. Admits to dietary indiscretion re: carbohydrate intake - will eat children's sweets in the house, sometimes excess bread intake. States -225 pounds, stable.     Pt endorses acute poor po intake over the past 2 days due to back pain. Improved this AM, good appetite for breakfast. Denied N/V/diarrhea +constipation, last BM x3 days ago but states he is comfortable and just began eating today.

## 2020-01-20 DIAGNOSIS — A40.9 STREPTOCOCCAL SEPSIS, UNSPECIFIED: ICD-10-CM

## 2020-01-20 PROCEDURE — 70460 CT HEAD/BRAIN W/DYE: CPT | Mod: 26

## 2020-01-20 PROCEDURE — 70491 CT SOFT TISSUE NECK W/DYE: CPT | Mod: 26

## 2020-01-20 PROCEDURE — 71260 CT THORAX DX C+: CPT | Mod: 26

## 2020-01-20 PROCEDURE — 72197 MRI PELVIS W/O & W/DYE: CPT | Mod: 26

## 2020-01-20 RX ORDER — TRAMADOL HYDROCHLORIDE 50 MG/1
50 TABLET ORAL
Refills: 0 | Status: DISCONTINUED | OUTPATIENT
Start: 2020-01-20 | End: 2020-01-21

## 2020-01-20 RX ORDER — MORPHINE SULFATE 50 MG/1
2 CAPSULE, EXTENDED RELEASE ORAL EVERY 4 HOURS
Refills: 0 | Status: DISCONTINUED | OUTPATIENT
Start: 2020-01-20 | End: 2020-01-21

## 2020-01-20 RX ORDER — OXYCODONE HYDROCHLORIDE 5 MG/1
10 TABLET ORAL
Refills: 0 | Status: DISCONTINUED | OUTPATIENT
Start: 2020-01-20 | End: 2020-01-21

## 2020-01-20 RX ORDER — CEFTRIAXONE 500 MG/1
2 INJECTION, POWDER, FOR SOLUTION INTRAMUSCULAR; INTRAVENOUS
Qty: 80 | Refills: 0
Start: 2020-01-20 | End: 2020-02-28

## 2020-01-20 RX ADMIN — MORPHINE SULFATE 2 MILLIGRAM(S): 50 CAPSULE, EXTENDED RELEASE ORAL at 06:00

## 2020-01-20 RX ADMIN — HEPARIN SODIUM 5000 UNIT(S): 5000 INJECTION INTRAVENOUS; SUBCUTANEOUS at 15:23

## 2020-01-20 RX ADMIN — Medication 1 TABLET(S): at 13:18

## 2020-01-20 RX ADMIN — OXYCODONE HYDROCHLORIDE 10 MILLIGRAM(S): 5 TABLET ORAL at 19:42

## 2020-01-20 RX ADMIN — Medication 1 MILLIGRAM(S): at 12:46

## 2020-01-20 RX ADMIN — LIDOCAINE 1 PATCH: 4 CREAM TOPICAL at 21:18

## 2020-01-20 RX ADMIN — LIDOCAINE 1 PATCH: 4 CREAM TOPICAL at 08:29

## 2020-01-20 RX ADMIN — OXYCODONE HYDROCHLORIDE 10 MILLIGRAM(S): 5 TABLET ORAL at 20:12

## 2020-01-20 RX ADMIN — HYDROMORPHONE HYDROCHLORIDE 0.5 MILLIGRAM(S): 2 INJECTION INTRAMUSCULAR; INTRAVENOUS; SUBCUTANEOUS at 00:28

## 2020-01-20 RX ADMIN — OXYCODONE HYDROCHLORIDE 10 MILLIGRAM(S): 5 TABLET ORAL at 15:21

## 2020-01-20 RX ADMIN — HYDROMORPHONE HYDROCHLORIDE 0.5 MILLIGRAM(S): 2 INJECTION INTRAMUSCULAR; INTRAVENOUS; SUBCUTANEOUS at 13:34

## 2020-01-20 RX ADMIN — CEFTRIAXONE 100 MILLIGRAM(S): 500 INJECTION, POWDER, FOR SOLUTION INTRAMUSCULAR; INTRAVENOUS at 15:24

## 2020-01-20 RX ADMIN — Medication 25 MILLIGRAM(S): at 05:56

## 2020-01-20 RX ADMIN — Medication 1 TABLET(S): at 21:18

## 2020-01-20 RX ADMIN — LIDOCAINE 1 PATCH: 4 CREAM TOPICAL at 07:29

## 2020-01-20 RX ADMIN — HEPARIN SODIUM 5000 UNIT(S): 5000 INJECTION INTRAVENOUS; SUBCUTANEOUS at 22:11

## 2020-01-20 RX ADMIN — HEPARIN SODIUM 5000 UNIT(S): 5000 INJECTION INTRAVENOUS; SUBCUTANEOUS at 06:37

## 2020-01-20 RX ADMIN — MORPHINE SULFATE 2 MILLIGRAM(S): 50 CAPSULE, EXTENDED RELEASE ORAL at 12:25

## 2020-01-20 RX ADMIN — HYDROMORPHONE HYDROCHLORIDE 0.5 MILLIGRAM(S): 2 INJECTION INTRAMUSCULAR; INTRAVENOUS; SUBCUTANEOUS at 15:14

## 2020-01-20 RX ADMIN — MORPHINE SULFATE 2 MILLIGRAM(S): 50 CAPSULE, EXTENDED RELEASE ORAL at 06:30

## 2020-01-20 RX ADMIN — MORPHINE SULFATE 2 MILLIGRAM(S): 50 CAPSULE, EXTENDED RELEASE ORAL at 12:10

## 2020-01-20 RX ADMIN — Medication 1 TABLET(S): at 05:56

## 2020-01-20 RX ADMIN — MORPHINE SULFATE 2 MILLIGRAM(S): 50 CAPSULE, EXTENDED RELEASE ORAL at 23:22

## 2020-01-20 RX ADMIN — OXYCODONE HYDROCHLORIDE 10 MILLIGRAM(S): 5 TABLET ORAL at 16:16

## 2020-01-20 RX ADMIN — MORPHINE SULFATE 2 MILLIGRAM(S): 50 CAPSULE, EXTENDED RELEASE ORAL at 23:07

## 2020-01-20 RX ADMIN — SIMVASTATIN 20 MILLIGRAM(S): 20 TABLET, FILM COATED ORAL at 21:18

## 2020-01-20 NOTE — PROGRESS NOTE ADULT - ASSESSMENT
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

## 2020-01-21 ENCOUNTER — TRANSCRIPTION ENCOUNTER (OUTPATIENT)
Age: 60
End: 2020-01-21

## 2020-01-21 PROCEDURE — 72100 X-RAY EXAM L-S SPINE 2/3 VWS: CPT | Mod: 26

## 2020-01-21 PROCEDURE — 36573 INSJ PICC RS&I 5 YR+: CPT

## 2020-01-21 RX ORDER — GABAPENTIN 400 MG/1
100 CAPSULE ORAL THREE TIMES A DAY
Refills: 0 | Status: DISCONTINUED | OUTPATIENT
Start: 2020-01-21 | End: 2020-01-21

## 2020-01-21 RX ORDER — TRAMADOL HYDROCHLORIDE 50 MG/1
50 TABLET ORAL EVERY 4 HOURS
Refills: 0 | Status: DISCONTINUED | OUTPATIENT
Start: 2020-01-21 | End: 2020-01-22

## 2020-01-21 RX ORDER — LOSARTAN POTASSIUM 100 MG/1
25 TABLET, FILM COATED ORAL DAILY
Refills: 0 | Status: DISCONTINUED | OUTPATIENT
Start: 2020-01-21 | End: 2020-01-22

## 2020-01-21 RX ORDER — CYCLOBENZAPRINE HYDROCHLORIDE 10 MG/1
1 TABLET, FILM COATED ORAL
Qty: 21 | Refills: 0
Start: 2020-01-21 | End: 2020-01-27

## 2020-01-21 RX ORDER — LACTOBACILLUS ACIDOPHILUS 100MM CELL
1 CAPSULE ORAL
Qty: 0 | Refills: 0 | DISCHARGE
Start: 2020-01-21

## 2020-01-21 RX ORDER — TRAMADOL HYDROCHLORIDE 50 MG/1
1 TABLET ORAL
Qty: 30 | Refills: 0
Start: 2020-01-21 | End: 2020-01-25

## 2020-01-21 RX ORDER — CHLORHEXIDINE GLUCONATE 213 G/1000ML
1 SOLUTION TOPICAL
Refills: 0 | Status: DISCONTINUED | OUTPATIENT
Start: 2020-01-21 | End: 2020-01-22

## 2020-01-21 RX ORDER — GABAPENTIN 400 MG/1
300 CAPSULE ORAL
Refills: 0 | Status: DISCONTINUED | OUTPATIENT
Start: 2020-01-21 | End: 2020-01-22

## 2020-01-21 RX ORDER — LIDOCAINE 4 G/100G
1 CREAM TOPICAL
Qty: 30 | Refills: 0
Start: 2020-01-21 | End: 2020-02-19

## 2020-01-21 RX ORDER — ACETAMINOPHEN 500 MG
2 TABLET ORAL
Qty: 0 | Refills: 0 | DISCHARGE
Start: 2020-01-21

## 2020-01-21 RX ORDER — CYCLOBENZAPRINE HYDROCHLORIDE 10 MG/1
10 TABLET, FILM COATED ORAL THREE TIMES A DAY
Refills: 0 | Status: DISCONTINUED | OUTPATIENT
Start: 2020-01-21 | End: 2020-01-22

## 2020-01-21 RX ORDER — OXYCODONE HYDROCHLORIDE 5 MG/1
5 TABLET ORAL EVERY 4 HOURS
Refills: 0 | Status: DISCONTINUED | OUTPATIENT
Start: 2020-01-21 | End: 2020-01-22

## 2020-01-21 RX ORDER — MORPHINE SULFATE 50 MG/1
2 CAPSULE, EXTENDED RELEASE ORAL
Refills: 0 | Status: DISCONTINUED | OUTPATIENT
Start: 2020-01-21 | End: 2020-01-22

## 2020-01-21 RX ORDER — GABAPENTIN 400 MG/1
100 CAPSULE ORAL ONCE
Refills: 0 | Status: COMPLETED | OUTPATIENT
Start: 2020-01-21 | End: 2020-01-21

## 2020-01-21 RX ORDER — SODIUM CHLORIDE 9 MG/ML
10 INJECTION INTRAMUSCULAR; INTRAVENOUS; SUBCUTANEOUS DAILY
Refills: 0 | Status: DISCONTINUED | OUTPATIENT
Start: 2020-01-21 | End: 2020-01-22

## 2020-01-21 RX ORDER — ATORVASTATIN CALCIUM 80 MG/1
20 TABLET, FILM COATED ORAL AT BEDTIME
Refills: 0 | Status: DISCONTINUED | OUTPATIENT
Start: 2020-01-21 | End: 2020-01-22

## 2020-01-21 RX ADMIN — Medication 1 TABLET(S): at 21:46

## 2020-01-21 RX ADMIN — MORPHINE SULFATE 2 MILLIGRAM(S): 50 CAPSULE, EXTENDED RELEASE ORAL at 07:29

## 2020-01-21 RX ADMIN — MORPHINE SULFATE 2 MILLIGRAM(S): 50 CAPSULE, EXTENDED RELEASE ORAL at 11:57

## 2020-01-21 RX ADMIN — MORPHINE SULFATE 2 MILLIGRAM(S): 50 CAPSULE, EXTENDED RELEASE ORAL at 20:11

## 2020-01-21 RX ADMIN — LIDOCAINE 1 PATCH: 4 CREAM TOPICAL at 07:30

## 2020-01-21 RX ADMIN — LIDOCAINE 1 PATCH: 4 CREAM TOPICAL at 21:46

## 2020-01-21 RX ADMIN — GABAPENTIN 100 MILLIGRAM(S): 400 CAPSULE ORAL at 17:38

## 2020-01-21 RX ADMIN — MORPHINE SULFATE 2 MILLIGRAM(S): 50 CAPSULE, EXTENDED RELEASE ORAL at 11:44

## 2020-01-21 RX ADMIN — HYDROMORPHONE HYDROCHLORIDE 0.5 MILLIGRAM(S): 2 INJECTION INTRAMUSCULAR; INTRAVENOUS; SUBCUTANEOUS at 00:26

## 2020-01-21 RX ADMIN — HYDROMORPHONE HYDROCHLORIDE 0.5 MILLIGRAM(S): 2 INJECTION INTRAMUSCULAR; INTRAVENOUS; SUBCUTANEOUS at 09:40

## 2020-01-21 RX ADMIN — Medication 25 MILLIGRAM(S): at 06:34

## 2020-01-21 RX ADMIN — HEPARIN SODIUM 5000 UNIT(S): 5000 INJECTION INTRAVENOUS; SUBCUTANEOUS at 16:05

## 2020-01-21 RX ADMIN — HYDROMORPHONE HYDROCHLORIDE 0.5 MILLIGRAM(S): 2 INJECTION INTRAMUSCULAR; INTRAVENOUS; SUBCUTANEOUS at 00:41

## 2020-01-21 RX ADMIN — ONDANSETRON 4 MILLIGRAM(S): 8 TABLET, FILM COATED ORAL at 00:27

## 2020-01-21 RX ADMIN — MORPHINE SULFATE 2 MILLIGRAM(S): 50 CAPSULE, EXTENDED RELEASE ORAL at 19:56

## 2020-01-21 RX ADMIN — MORPHINE SULFATE 2 MILLIGRAM(S): 50 CAPSULE, EXTENDED RELEASE ORAL at 16:14

## 2020-01-21 RX ADMIN — HEPARIN SODIUM 5000 UNIT(S): 5000 INJECTION INTRAVENOUS; SUBCUTANEOUS at 06:34

## 2020-01-21 RX ADMIN — Medication 1 TABLET(S): at 16:05

## 2020-01-21 RX ADMIN — CEFTRIAXONE 100 MILLIGRAM(S): 500 INJECTION, POWDER, FOR SOLUTION INTRAMUSCULAR; INTRAVENOUS at 16:05

## 2020-01-21 RX ADMIN — MORPHINE SULFATE 2 MILLIGRAM(S): 50 CAPSULE, EXTENDED RELEASE ORAL at 16:29

## 2020-01-21 RX ADMIN — ATORVASTATIN CALCIUM 20 MILLIGRAM(S): 80 TABLET, FILM COATED ORAL at 21:46

## 2020-01-21 RX ADMIN — LIDOCAINE 1 PATCH: 4 CREAM TOPICAL at 09:20

## 2020-01-21 RX ADMIN — MORPHINE SULFATE 2 MILLIGRAM(S): 50 CAPSULE, EXTENDED RELEASE ORAL at 07:44

## 2020-01-21 RX ADMIN — HYDROMORPHONE HYDROCHLORIDE 0.5 MILLIGRAM(S): 2 INJECTION INTRAMUSCULAR; INTRAVENOUS; SUBCUTANEOUS at 09:25

## 2020-01-21 RX ADMIN — Medication 1 TABLET(S): at 06:34

## 2020-01-21 RX ADMIN — CYCLOBENZAPRINE HYDROCHLORIDE 10 MILLIGRAM(S): 10 TABLET, FILM COATED ORAL at 19:58

## 2020-01-21 NOTE — DISCHARGE NOTE PROVIDER - NSDCCPCAREPLAN_GEN_ALL_CORE_FT
PRINCIPAL DISCHARGE DIAGNOSIS  Diagnosis: Streptococcal septicemia  Assessment and Plan of Treatment: follow up with ID MD Dr. JOSHI      SECONDARY DISCHARGE DIAGNOSES  Diagnosis: Prostatic disease  Assessment and Plan of Treatment:     Diagnosis: Fever  Assessment and Plan of Treatment:

## 2020-01-21 NOTE — DISCHARGE NOTE PROVIDER - NSDCMRMEDTOKEN_GEN_ALL_CORE_FT
acetaminophen 325 mg oral tablet: 2 tab(s) orally every 6 hours, As needed, Temp greater or equal to 38C (100.4F)  atorvastatin 20 mg oral tablet: 1 tab(s) orally once a day  cefTRIAXone 2 g injection: 2 gram(s) intravenously once a day     weekly labs CBC, BMP, ESR on Mondays faxed to [phone]  cyclobenzaprine 5 mg oral tablet: 1 tab(s) orally 3 times a day, As needed, Muscle Spasm  lactobacillus acidophilus oral capsule: 1  orally once a day  lidocaine 5% topical film: Apply topically to affected area once a day   telmisartan 40 mg oral tablet: 1 tab(s) orally once a day  Toprol-XL 50 mg oral tablet, extended release: 1 tab(s) orally once a day  traMADol 50 mg oral tablet: 1 tab(s) orally every 4 hours, As needed, Mild Pain (1 - 3) MDD:6 tabs acetaminophen 325 mg oral tablet: 2 tab(s) orally every 6 hours, As needed, Temp greater or equal to 38C (100.4F)  atorvastatin 20 mg oral tablet: 1 tab(s) orally once a day  cefTRIAXone 2 g injection: 2 gram(s) intravenously once a day     weekly labs CBC, BMP, ESR on Mondays faxed to [phone]  cyclobenzaprine 5 mg oral tablet: 1 tab(s) orally 3 times a day, As needed, Muscle Spasm  gabapentin 100 mg oral capsule: 1 cap(s) orally 3 times a day  lactobacillus acidophilus oral capsule: 1  orally once a day  lidocaine 5% topical film: Apply topically to affected area once a day   telmisartan 40 mg oral tablet: 1 tab(s) orally once a day  Toprol-XL 50 mg oral tablet, extended release: 1 tab(s) orally once a day  traMADol 50 mg oral tablet: 1 tab(s) orally every 4 hours, As needed, Mild Pain (1 - 3) MDD:6 tabs

## 2020-01-21 NOTE — DISCHARGE NOTE PROVIDER - CARE PROVIDER_API CALL
Gurmeet Paz; PhD)  Infectious Disease; Internal Medicine  [address]  [place] [address]  Phone: [phone]  Fax: [phone]  Follow Up Time:     Naveed Reynaga (DO)  Family Medicine  [address], [address]  [place] [address]  Phone: [phone]  Fax: [phone]  Follow Up Time:     Trae Zuluaga (DO)  Orthopaedic Surgery  [address] [place] [address]  Phone: [phone]  Fax: [phone]  Follow Up Time:

## 2020-01-21 NOTE — DISCHARGE NOTE NURSING/CASE MANAGEMENT/SOCIAL WORK - PATIENT PORTAL LINK FT
You can access the FollowMyHealth Patient Portal offered by [facility] by registering at the following website: [email or web address]. By joining [employer]’s FollowMyHealth portal, you will also be able to view your health information using other applications (apps) compatible with our system.

## 2020-01-21 NOTE — DISCHARGE NOTE PROVIDER - HOSPITAL COURSE
admitted for acute febrile illness with back pain    found to have strep bacteremia    ABX per ID    No epidural abcess    pain control with narcotics    DC after ID clearance    out patient dental work up for possible tooth remnant removal

## 2020-01-21 NOTE — PROGRESS NOTE ADULT - ASSESSMENT
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

## 2020-01-21 NOTE — DISCHARGE NOTE PROVIDER - PROVIDER TOKENS
PROVIDER:[TOKEN:[[id number]]],PROVIDER:[TOKEN:[[id number]]],PROVIDER:[TOKEN:[[id number]]]

## 2020-01-21 NOTE — DISCHARGE NOTE PROVIDER - CARE PROVIDERS DIRECT ADDRESSES
[email or web address]

## 2020-01-21 NOTE — CONSULT NOTE ADULT - ASSESSMENT
59 M with Lumbar spinal stenosis     Case and imaging discussed with Dr. Loco, based on clinical presentation with febrile and low back pain, possible low suspicion for OM/discitis. based on exam patient without red flag symptoms. Complaining of mild to moderate low back pain nonradiating.       - WBAT   - No further imaging  - Continue ID management and care, PICC line today for IV abx  - Patient may follow up with Dr. Loco as outpatient within 1 week, call office for appointment  - Analgesia as needed  -PT/OT  - If planning for discharge patient ortho stable for discharge  - Otherwise will follow patient and monitor for improvement in pain

## 2020-01-21 NOTE — CONSULT NOTE ADULT - SUBJECTIVE AND OBJECTIVE BOX
Patient is a 59 male with history of chronic low back pain, who initially presented to [facility] complaining of fever, and blood in urine. Patient also complaining of nonspecific low back pain. Patient denies any trauma to his back, or recent falls. He has been able to ambulate, and denies any lower extremity weakness or numbness. Patient denies any bowel or bladder changes. Patient says he has been treated once or twice within the past 20 years with epidural injections. On admission patient's blood cultures were positive for strep species. ID was consulted on the patient and treating him for presumed osteomyelitis with IV antibiotics     PAST MEDICAL & SURGICAL HISTORY:  Obesity (ICD9 278.00)  Rotator Cuff Syndrome of Shoulder and Allied Disorders (ICD9 726.10)  Hyperlipidemia (ICD9 272.4)  Rt wrist surgery 2005  S/P Shoulder Surgery (ICD9 V45.89) 1980, 1990    Home Medications:  acetaminophen 325 mg oral tablet: 2 tab(s) orally every 6 hours, As needed, Temp greater or equal to 38C (100.4F) (21 Jan 2020 12:06)  atorvastatin 20 mg oral tablet: 1 tab(s) orally once a day (16 Jan 2020 18:44)  lactobacillus acidophilus oral capsule: 1  orally once a day (21 Jan 2020 12:06)  telmisartan 40 mg oral tablet: 1 tab(s) orally once a day (16 Jan 2020 18:44)  Toprol-XL 50 mg oral tablet, extended release: 1 tab(s) orally once a day (16 Jan 2020 18:44)    Vital Signs Last 24 Hrs  T(C): 36.7 (21 Jan 2020 13:02), Max: 37.2 (20 Jan 2020 21:07)  T(F): 98 (21 Jan 2020 13:02), Max: 99 (20 Jan 2020 21:07)  HR: 71 (21 Jan 2020 13:02) (66 - 75)  BP: 116/74 (21 Jan 2020 13:02) (111/69 - 116/74)  BP(mean): --  RR: 18 (21 Jan 2020 13:02) (18 - 18)  SpO2: 96% (21 Jan 2020 13:02) (96% - 97%)      Physical Exam:  Gen: NAD    Spine Exam:  Skin intact  No gross deformity  No midline TTP C/T/L/S spine  No bony step offs  No paraspinal muscle TTP/Hypertonicity   No Radicular Symptoms with SLR  Negative clonus  Negative babinski  No saddle anesthesia    Motor:               C5           C6            C7               C8           T1   R         5/5          5/5            5/5             5/5          5/5  L          5/5          5/5            5/5             5/5          5/5                L2             L3             L4               L5            S1  R         5/5           5/5          5/5             5/5           5/5  L          5/5          5/5           5/5             5/5           5/5    Sensory:            C5         C6         C7      C8       T1        (0=absent, 1=impaired, 2=normal, NT=not testable)  R         2            2           2        2         2  L          2            2           2        2         2               L2          L3         L4      L5       S1         (0=absent, 1=impaired, 2=normal, NT=not testable)  R         2            2            2        2        2  L          2            2           2        2         2       Imaging:    MR L spine/ Sacrum:  L1-L2, L3-L4, L5-S1 broadbased herniated disc with severe stenosis  Overall multilevel broad based changes  No obvious signal changes consistent with OM / discitis

## 2020-01-22 VITALS
SYSTOLIC BLOOD PRESSURE: 111 MMHG | TEMPERATURE: 98 F | HEART RATE: 87 BPM | OXYGEN SATURATION: 97 % | RESPIRATION RATE: 17 BRPM | DIASTOLIC BLOOD PRESSURE: 76 MMHG

## 2020-01-22 PROCEDURE — 87150 DNA/RNA AMPLIFIED PROBE: CPT

## 2020-01-22 PROCEDURE — 36573 INSJ PICC RS&I 5 YR+: CPT

## 2020-01-22 PROCEDURE — 36415 COLL VENOUS BLD VENIPUNCTURE: CPT

## 2020-01-22 PROCEDURE — 83605 ASSAY OF LACTIC ACID: CPT

## 2020-01-22 PROCEDURE — 76937 US GUIDE VASCULAR ACCESS: CPT

## 2020-01-22 PROCEDURE — 81001 URINALYSIS AUTO W/SCOPE: CPT

## 2020-01-22 PROCEDURE — C1751: CPT

## 2020-01-22 PROCEDURE — 97161 PT EVAL LOW COMPLEX 20 MIN: CPT

## 2020-01-22 PROCEDURE — A9579: CPT

## 2020-01-22 PROCEDURE — 72197 MRI PELVIS W/O & W/DYE: CPT

## 2020-01-22 PROCEDURE — 87631 RESP VIRUS 3-5 TARGETS: CPT

## 2020-01-22 PROCEDURE — 77001 FLUOROGUIDE FOR VEIN DEVICE: CPT

## 2020-01-22 PROCEDURE — 85652 RBC SED RATE AUTOMATED: CPT

## 2020-01-22 PROCEDURE — 72158 MRI LUMBAR SPINE W/O & W/DYE: CPT

## 2020-01-22 PROCEDURE — 71260 CT THORAX DX C+: CPT

## 2020-01-22 PROCEDURE — 83690 ASSAY OF LIPASE: CPT

## 2020-01-22 PROCEDURE — 70491 CT SOFT TISSUE NECK W/DYE: CPT

## 2020-01-22 PROCEDURE — 87486 CHLMYD PNEUM DNA AMP PROBE: CPT

## 2020-01-22 PROCEDURE — 70460 CT HEAD/BRAIN W/DYE: CPT

## 2020-01-22 PROCEDURE — 85027 COMPLETE CBC AUTOMATED: CPT

## 2020-01-22 PROCEDURE — 80048 BASIC METABOLIC PNL TOTAL CA: CPT

## 2020-01-22 PROCEDURE — 87633 RESP VIRUS 12-25 TARGETS: CPT

## 2020-01-22 PROCEDURE — 87798 DETECT AGENT NOS DNA AMP: CPT

## 2020-01-22 PROCEDURE — 96375 TX/PRO/DX INJ NEW DRUG ADDON: CPT

## 2020-01-22 PROCEDURE — 87581 M.PNEUMON DNA AMP PROBE: CPT

## 2020-01-22 PROCEDURE — 87184 SC STD DISK METHOD PER PLATE: CPT

## 2020-01-22 PROCEDURE — 86803 HEPATITIS C AB TEST: CPT

## 2020-01-22 PROCEDURE — 80053 COMPREHEN METABOLIC PANEL: CPT

## 2020-01-22 PROCEDURE — 93306 TTE W/DOPPLER COMPLETE: CPT

## 2020-01-22 PROCEDURE — 74177 CT ABD & PELVIS W/CONTRAST: CPT

## 2020-01-22 PROCEDURE — 86140 C-REACTIVE PROTEIN: CPT

## 2020-01-22 PROCEDURE — 87086 URINE CULTURE/COLONY COUNT: CPT

## 2020-01-22 PROCEDURE — 83036 HEMOGLOBIN GLYCOSYLATED A1C: CPT

## 2020-01-22 PROCEDURE — 99285 EMERGENCY DEPT VISIT HI MDM: CPT | Mod: 25

## 2020-01-22 PROCEDURE — 96374 THER/PROPH/DIAG INJ IV PUSH: CPT

## 2020-01-22 PROCEDURE — 87040 BLOOD CULTURE FOR BACTERIA: CPT

## 2020-01-22 PROCEDURE — 72100 X-RAY EXAM L-S SPINE 2/3 VWS: CPT

## 2020-01-22 PROCEDURE — 84145 PROCALCITONIN (PCT): CPT

## 2020-01-22 RX ORDER — OXYCODONE HYDROCHLORIDE 5 MG/1
5 TABLET ORAL ONCE
Refills: 0 | Status: DISCONTINUED | OUTPATIENT
Start: 2020-01-22 | End: 2020-01-22

## 2020-01-22 RX ORDER — GABAPENTIN 400 MG/1
1 CAPSULE ORAL
Qty: 90 | Refills: 0
Start: 2020-01-22 | End: 2020-02-20

## 2020-01-22 RX ADMIN — MORPHINE SULFATE 2 MILLIGRAM(S): 50 CAPSULE, EXTENDED RELEASE ORAL at 05:14

## 2020-01-22 RX ADMIN — MORPHINE SULFATE 2 MILLIGRAM(S): 50 CAPSULE, EXTENDED RELEASE ORAL at 15:34

## 2020-01-22 RX ADMIN — Medication 25 MILLIGRAM(S): at 06:34

## 2020-01-22 RX ADMIN — HYDROMORPHONE HYDROCHLORIDE 0.5 MILLIGRAM(S): 2 INJECTION INTRAMUSCULAR; INTRAVENOUS; SUBCUTANEOUS at 06:58

## 2020-01-22 RX ADMIN — Medication 1 TABLET(S): at 15:36

## 2020-01-22 RX ADMIN — HYDROMORPHONE HYDROCHLORIDE 0.5 MILLIGRAM(S): 2 INJECTION INTRAMUSCULAR; INTRAVENOUS; SUBCUTANEOUS at 07:13

## 2020-01-22 RX ADMIN — LIDOCAINE 1 PATCH: 4 CREAM TOPICAL at 07:30

## 2020-01-22 RX ADMIN — CHLORHEXIDINE GLUCONATE 1 APPLICATION(S): 213 SOLUTION TOPICAL at 06:34

## 2020-01-22 RX ADMIN — MORPHINE SULFATE 2 MILLIGRAM(S): 50 CAPSULE, EXTENDED RELEASE ORAL at 15:48

## 2020-01-22 RX ADMIN — HEPARIN SODIUM 5000 UNIT(S): 5000 INJECTION INTRAVENOUS; SUBCUTANEOUS at 06:34

## 2020-01-22 RX ADMIN — LIDOCAINE 1 PATCH: 4 CREAM TOPICAL at 09:00

## 2020-01-22 RX ADMIN — OXYCODONE HYDROCHLORIDE 5 MILLIGRAM(S): 5 TABLET ORAL at 11:09

## 2020-01-22 RX ADMIN — Medication 1 TABLET(S): at 06:34

## 2020-01-22 RX ADMIN — CEFTRIAXONE 100 MILLIGRAM(S): 500 INJECTION, POWDER, FOR SOLUTION INTRAMUSCULAR; INTRAVENOUS at 15:35

## 2020-01-22 RX ADMIN — GABAPENTIN 300 MILLIGRAM(S): 400 CAPSULE ORAL at 06:34

## 2020-01-22 RX ADMIN — OXYCODONE HYDROCHLORIDE 5 MILLIGRAM(S): 5 TABLET ORAL at 12:00

## 2020-01-22 RX ADMIN — LOSARTAN POTASSIUM 25 MILLIGRAM(S): 100 TABLET, FILM COATED ORAL at 06:34

## 2020-01-22 RX ADMIN — HEPARIN SODIUM 5000 UNIT(S): 5000 INJECTION INTRAVENOUS; SUBCUTANEOUS at 15:35

## 2020-01-22 RX ADMIN — MORPHINE SULFATE 2 MILLIGRAM(S): 50 CAPSULE, EXTENDED RELEASE ORAL at 04:59

## 2020-01-22 NOTE — PROGRESS NOTE ADULT - PROBLEM SELECTOR PLAN 3
Heparin for DVT prevention
as above.
as above.
as noted pt feels that he is not getting adequate pain control
as noted pt feels that he is not getting adequate pain control  From an ID standpoint no further requirement for inpatient status for the management of ID issues. Fine with discharge from ID standpoint when other medical issues no longer require inpatient care and social issues allow for a safe discharge plan.  Thank you for consulting us and involving us in the management of this most interesting and challenging case.     Please Call with any further questions
as noted pt feels that he is not getting adequate pain control but will defer to medicine  From an ID standpoint no further requirement for inpatient status for the management of ID issues. Fine with discharge from ID standpoint when other medical issues no longer require inpatient care and social issues allow for a safe discharge plan.  Thank you for consulting us and involving us in the management of this most interesting and challenging case.     Please Call with any further questions
will follow MRI for potential need for site control

## 2020-01-22 NOTE — PROGRESS NOTE ADULT - PROBLEM SELECTOR PROBLEM 3
Preventive measure
Acute osteomyelitis
Back pain

## 2020-01-22 NOTE — PROGRESS NOTE ADULT - PROBLEM SELECTOR PROBLEM 1
Back pain
Acute osteomyelitis
Fever

## 2020-01-22 NOTE — PROGRESS NOTE ADULT - SUBJECTIVE AND OBJECTIVE BOX
Culture - Blood (01.16.20 @ 22:01)    Gram Stain:   Growth in aerobic bottle: Gram Positive Cocci in Pairs and Chains    -  Streptococcus sp. (Not Grp A, B or S pneumoniae): Detec    Specimen Source: .Blood Blood    Organism: Blood Culture PCR    Culture Results:   Growth in aerobic bottle: Gram Positive Cocci in Pairs and Chains
Interventional Radiology Central Line Insertion    Patient and/or family/surrogate educated about central line-associated blood stream infection prevention practices  Central Line insertion checklist utilized    Catheter Type: (  ) Dialysis  (  ) Introducer  (   ) Central venous catheter   ( X ) peripherally inserted central catheter (PICC)      Number of Lumens: ( X ) single   (  ) Double   (   ) Triple  (  ) Antimicrobial catheter    TIME OUT performed prior to this procedure with verbal confirmation of correct patient identity, correct site, side and annie (if applicable), agreement of the procedure, correct patient position, availability of necessary equipment.  The consent form (if applicable) is complete and accurate. Safety precautions based on patient history or medication use has been addressed.   RN at bedside.    Procedure:  The patient was placed in the ( X ) Supine position, (  ) Trendelenburg postiton.  The patient's placement site was prepped with Chlorhexidine solution then draped using maximum sterile barrier protection.  The area was injected with ___6__ cc of 1% Lidocaine.  Using the (  ) Seldinger technique  ( X ) Modified Seldinger technique  ( X ) Ultrasound, the catheter was introduced.  Strict adherence to outlined aseptic technique, including hand washing prior to donning sterile barriers (gloves and gown), utilizing a mask and cap, plus draping the patient with a sterile drape was observed.  Upon completion of the line placement, the insertion site was covered with sterile dressing.    All materials used for catheter insertion, including the intact guidewire, were accounted for at the end of the procedure.    Emergency placement ( X ) No    (   ) Yes   (  X ) New Site    (   ) Guide Wire change    Attempted site and actual site ( X  ) Right  (   ) Left      Basilic Vein     Post Procedure (Catheter Placement Verification)  Correct placement confirmed by pressure transducer or ultrasonography or venous saturation.  The tip of the catheter is confirmed to be in appropriate position by radiography which revealed no pneumothorax. Line may be accessed.
Neurology Follow up note    ZACH GUERRA59yMale    HPI:  pt with low back pain for last 2 days, pain with movement, saw PMD today, noted that he had a fever and blood in the urine, sent pt in for further evaluation.  pt denies any n/v/d, denies any abdominal pain.  reports only very low (sacral) midline back pain.  In ER patient was found to have disc herniation.  Patient is being admitted for further work up and treatment. (16 Jan 2020 18:01)      Interval History - my back pain got worse last night    Patient is seen, chart was reviewed and case was discussed with the treatment team.  Pt is not in any distress.   Lying on bed comfortably.   No events reported overnight.   No clinical seizure was reported.    Vital Signs Last 24 Hrs  T(C): 36.7 (20 Jan 2020 05:25), Max: 37.2 (19 Jan 2020 21:19)  T(F): 98.1 (20 Jan 2020 05:25), Max: 99 (19 Jan 2020 21:19)  HR: 70 (20 Jan 2020 05:25) (70 - 78)  BP: 118/73 (20 Jan 2020 05:25) (118/73 - 142/78)  BP(mean): --  RR: 17 (20 Jan 2020 05:25) (17 - 18)  SpO2: 96% (20 Jan 2020 05:25) (96% - 96%)        REVIEW OF SYSTEMS:    Constitutional: No fever, weight loss or fatigue  Eyes: No eye pain, visual disturbances, or discharge  ENT:  No difficulty hearing, tinnitus, vertigo; No sinus or throat pain  Neck: No pain or stiffness  Respiratory: No cough, wheezing, chills or hemoptysis  Cardiovascular: No chest pain, palpitations,   Gastrointestinal: No abdominal or epigastric pain. No nausea, vomiting or hematemesis;  Genitourinary: No dysuria, frequency, hematuria or incontinence  Neurological: No headaches, memory loss, loss of strength, numbness or tremors  Psychiatric: No depression, anxiety, mood swings or difficulty sleeping  Musculoskeletal: No joint pain or swelling;  Skin: No itching, burning, rashes or lesions   Lymph Nodes: No enlarged glands  Endocrine: No heat or cold intolerance; No hair loss,  Allergy and Immunologic: No hives or eczema    On Neurological Examination:    Mental Status - Pt is alert, awake, oriented X3.Follows commands well and able to answer questions appropriately  .Mood and affect  normal    Speech -  Normal.     Cranial Nerves - Pupils 3 mm equal and reactive to light, extraocular eye movements intact.   Pt has no visual field deficit.  Pt has no  facial asymmetry. Facial sensation is intact.Tongue - is in midline.    Muscle tone - is normal all over. Moves all extremities equally.     Motor Exam - 5/5 all over, No drift. No shaking or tremors.    Sensory Exam - Pin prick, temperature, joint position and vibration are intact on either side. Pt withdraws all extremities equally on stimulation. No asymmetry seen. No complaints of tingling, numbness.    Gait - Able to stand and walk unassisted.        coordination:    Finger to nose: normal      Deep tendon Reflexes - 2 plus all over.       Neck Supple -  Yes.     MEDICATIONS    MEDICATIONS  (STANDING):  cefTRIAXone   IVPB 2000 milliGRAM(s) IV Intermittent every 24 hours  heparin  Injectable 5000 Unit(s) SubCutaneous every 8 hours  lactobacillus acidophilus 1 Tablet(s) Oral three times a day  lidocaine   Patch 1 Patch Transdermal every 24 hours  LORazepam     Tablet 1 milliGRAM(s) Oral once  metoprolol succinate ER 25 milliGRAM(s) Oral daily  simvastatin 20 milliGRAM(s) Oral at bedtime      Allergies    No Known Allergies    Intolerances                                         12.8   8.44  )-----------( 160      ( 18 Jan 2020 13:57 )             37.9   01-18    134<L>  |  99  |  20  ----------------------------<  120<H>  3.5   |  27  |  0.90    Ca    8.5      18 Jan 2020 13:57              Hemoglobin A1C:     Vitamin B12     RADIOLOGY  < from: MR Lumbar Spine w/wo IV Cont (01.17.20 @ 18:10) >    EXAM:  MR SPINE LUMBAR WAW IC                            PROCEDURE DATE:  01/17/2020          INTERPRETATION:  CLINICAL INDICATION: Back pain. Evaluate for epidural abscess and osteomyelitis.    TECHNIQUE: Multiplanar multisequence MRI of the lumbar spine was performed before and after the intravenous administration of 10 ml of Gadavist, according to standard protocol.    COMPARISON: CT abdomen and pelvis 1/16/2020.    FINDINGS:    ALIGNMENT: Straightening of the expected lumbar lordosis, without listhesis. There is a minimal levoconvex curvature to the lumbar spine.  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    VERTEBRAE: The vertebral bodies are normal in height. There is no acute fracture or aggressive osseous lesion.   There is a prominent Schmorl's node along the L5 superior endplate.    DISCS: There is degenerative loss of intervertebral disc space height particularly at L4-L5 level.    CONUS MEDULLARIS AND CAUDA EQUINA: The conus medullaris terminates at L1. There is normal appearance of the conus medullaris.    ENHANCEMENT: There is no evidence of abnormal enhancement.    PARAVERTEBRAL SOFT TISSUES AND VISUALIZED RETROPERITONEUM: The visualized paravertebral soft tissues appear within normal limits.  There are T2/STIR hyperintense renal cysts bilaterally, incompletely evaluated.    EVALUATION OF INDIVIDUAL LEVELS:   T12-L1: Mild disc bulge and degenerative changes of the bilateral facet joints resulting in moderate canal stenosis and mild-to-moderate left, mild right neuroforaminal narrowing.    L1-2: Disc bulge and degenerative changes of the bilateral facet joints, thickening of ligamentum flavum resulting in moderate to severe canal stenosis and compression of the central descending cauda equina nerve roots (series 6, image 14). There is also moderate neuroforaminal narrowing bilaterally.    L2-3: Disc bulge and degenerative changes of the bilateral facet joints, thickening of the ligamentum flavum resulting in moderate canal stenosis and moderate to severe right, moderate left neuroforaminal narrowing.    L3-4: Disc bulge and degenerative changes of the bilateral facet joints, thickening of the ligamentum flavum resulting in mild-to-moderate canal stenosis and severe neuroforaminal narrowing bilaterally, right greater than left.    L4-5: Disc bulge with superimposed disc protrusion and degenerative changes of the facet joints resulting in mild to moderate canal stenosis and severe neuroforaminal narrowing bilaterally.    L5-S1: Degenerative changes of the bilateral facet joints resulting in mild right neural foraminal narrowing. No canal stenosis or neural foraminal narrowing on the left.    LIMITED EVALUATION OF UPPER SACRUM AND SACROILIAC JOINTS: Mild degenerative changes of the sacroiliac joints.    IMPRESSION:     Multilevel degenerative changes of the lumbar spine superimposed on a congenitally narrow lumbar spinal canal particularly pronounced at L1-L2 and L2-L3, resulting in moderate to severe no stenosis and moderate canal stenosis, respectively. Correlation with symptoms and clinical exam findings is advised.        EMIL PAULSON M.D., ATTENDING RADIOLOGIST  This document has been electronically signed. Jan 17 2020  6:31PM              ASSESSMENT AND PLAN:      presented with fever and severe low back pain; afebrile today  streptoccocal bacteremia ? sacral OM  MRI OF L-S SPINE RESULTS NOTED.    FOR MRI OF SACRUM  ANTIBIOTIC AS PER ID.  ANALGESIC.  Physical therapy evaluation.  Pain is accessed and addressed.  Plan of care was discussed with family. Questions answered.  Would continue to follow.
Neurology Follow up note    ZACH GUERRA59yMale    HPI:  pt with low back pain for last 2 days, pain with movement, saw PMD today, noted that he had a fever and blood in the urine, sent pt in for further evaluation.  pt denies any n/v/d, denies any abdominal pain.  reports only very low (sacral) midline back pain.  In ER patient was found to have disc herniation.  Patient is being admitted for further work up and treatment. (16 Jan 2020 18:01)      Interval History - still c/o lbp    Patient is seen, chart was reviewed and case was discussed with the treatment team.  Pt is not in any distress.   Lying on bed comfortably.   No events reported overnight.   No clinical seizure was reported.    Vital Signs Last 24 Hrs  T(C): 36.7 (21 Jan 2020 13:02), Max: 37.2 (20 Jan 2020 21:07)  T(F): 98 (21 Jan 2020 13:02), Max: 99 (20 Jan 2020 21:07)  HR: 71 (21 Jan 2020 13:02) (66 - 75)  BP: 116/74 (21 Jan 2020 13:02) (111/69 - 116/74)  BP(mean): --  RR: 18 (21 Jan 2020 13:02) (18 - 18)  SpO2: 96% (21 Jan 2020 13:02) (96% - 97%)        REVIEW OF SYSTEMS:    Constitutional: No fever, weight loss or fatigue  Eyes: No eye pain, visual disturbances, or discharge  ENT:  No difficulty hearing, tinnitus, vertigo; No sinus or throat pain  Neck: No pain or stiffness  Respiratory: No cough, wheezing, chills or hemoptysis  Cardiovascular: No chest pain, palpitations,   Gastrointestinal: No abdominal or epigastric pain. No nausea, vomiting or hematemesis;  Genitourinary: No dysuria, frequency, hematuria or incontinence  Neurological: No headaches, memory loss, loss of strength, numbness or tremors  Psychiatric: No depression, anxiety, mood swings or difficulty sleeping  Musculoskeletal: No joint pain or swelling;  Skin: No itching, burning, rashes or lesions   Lymph Nodes: No enlarged glands  Endocrine: No heat or cold intolerance; No hair loss,  Allergy and Immunologic: No hives or eczema    On Neurological Examination:    Mental Status - Pt is alert, awake, oriented X3.Follows commands well and able to answer questions appropriately  .Mood and affect  normal    Speech -  Normal.     Cranial Nerves - Pupils 3 mm equal and reactive to light, extraocular eye movements intact.   Pt has no visual field deficit.  Pt has no  facial asymmetry. Facial sensation is intact.Tongue - is in midline.    Muscle tone - is normal all over. Moves all extremities equally.     Motor Exam - 5/5 all over, No drift. No shaking or tremors.    Sensory Exam - Pin prick, temperature, joint position and vibration are intact on either side. Pt withdraws all extremities equally on stimulation. No asymmetry seen. No complaints of tingling, numbness.    Gait - Able to stand and walk unassisted.        coordination:    Finger to nose: normal      Deep tendon Reflexes - 2 plus all over.       Neck Supple -  Yes.     MEDICATIONS  MEDICATIONS  (STANDING):  atorvastatin 20 milliGRAM(s) Oral at bedtime  cefTRIAXone   IVPB 2000 milliGRAM(s) IV Intermittent every 24 hours  chlorhexidine 4% Liquid 1 Application(s) Topical <User Schedule>  gabapentin 100 milliGRAM(s) Oral three times a day  heparin  Injectable 5000 Unit(s) SubCutaneous every 8 hours  lactobacillus acidophilus 1 Tablet(s) Oral three times a day  lidocaine   Patch 1 Patch Transdermal every 24 hours  losartan 25 milliGRAM(s) Oral daily  metoprolol succinate ER 25 milliGRAM(s) Oral daily        Allergies    No Known Allergies    Intolerances                                         12.8   8.44  )-----------( 160      ( 18 Jan 2020 13:57 )             37.9   01-18    134<L>  |  99  |  20  ----------------------------<  120<H>  3.5   |  27  |  0.90    Ca    8.5      18 Jan 2020 13:57              Hemoglobin A1C:     Vitamin B12     RADIOLOGY  < from: MR Lumbar Spine w/wo IV Cont (01.17.20 @ 18:10) >    EXAM:  MR SPINE LUMBAR WAW IC                            PROCEDURE DATE:  01/17/2020          INTERPRETATION:  CLINICAL INDICATION: Back pain. Evaluate for epidural abscess and osteomyelitis.    TECHNIQUE: Multiplanar multisequence MRI of the lumbar spine was performed before and after the intravenous administration of 10 ml of Gadavist, according to standard protocol.    COMPARISON: CT abdomen and pelvis 1/16/2020.    FINDINGS:    ALIGNMENT: Straightening of the expected lumbar lordosis, without listhesis. There is a minimal levoconvex curvature to the lumbar spine.  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    VERTEBRAE: The vertebral bodies are normal in height. There is no acute fracture or aggressive osseous lesion.   There is a prominent Schmorl's node along the L5 superior endplate.    DISCS: There is degenerative loss of intervertebral disc space height particularly at L4-L5 level.    CONUS MEDULLARIS AND CAUDA EQUINA: The conus medullaris terminates at L1. There is normal appearance of the conus medullaris.    ENHANCEMENT: There is no evidence of abnormal enhancement.    PARAVERTEBRAL SOFT TISSUES AND VISUALIZED RETROPERITONEUM: The visualized paravertebral soft tissues appear within normal limits.  There are T2/STIR hyperintense renal cysts bilaterally, incompletely evaluated.    EVALUATION OF INDIVIDUAL LEVELS:   T12-L1: Mild disc bulge and degenerative changes of the bilateral facet joints resulting in moderate canal stenosis and mild-to-moderate left, mild right neuroforaminal narrowing.    L1-2: Disc bulge and degenerative changes of the bilateral facet joints, thickening of ligamentum flavum resulting in moderate to severe canal stenosis and compression of the central descending cauda equina nerve roots (series 6, image 14). There is also moderate neuroforaminal narrowing bilaterally.    L2-3: Disc bulge and degenerative changes of the bilateral facet joints, thickening of the ligamentum flavum resulting in moderate canal stenosis and moderate to severe right, moderate left neuroforaminal narrowing.    L3-4: Disc bulge and degenerative changes of the bilateral facet joints, thickening of the ligamentum flavum resulting in mild-to-moderate canal stenosis and severe neuroforaminal narrowing bilaterally, right greater than left.    L4-5: Disc bulge with superimposed disc protrusion and degenerative changes of the facet joints resulting in mild to moderate canal stenosis and severe neuroforaminal narrowing bilaterally.    L5-S1: Degenerative changes of the bilateral facet joints resulting in mild right neural foraminal narrowing. No canal stenosis or neural foraminal narrowing on the left.    LIMITED EVALUATION OF UPPER SACRUM AND SACROILIAC JOINTS: Mild degenerative changes of the sacroiliac joints.    IMPRESSION:     Multilevel degenerative changes of the lumbar spine superimposed on a congenitally narrow lumbar spinal canal particularly pronounced at L1-L2 and L2-L3, resulting in moderate to severe no stenosis and moderate canal stenosis, respectively. Correlation with symptoms and clinical exam findings is advised.        EMIL LORENE M.D., ATTENDING RADIOLOGIST  This document has been electronically signed. Jan 17 2020  6:31PM      < from: MR Pelvis w/wo IV Cont (01.20.20 @ 14:34) >    EXAM:  MR PELVIS WAW IC                            PROCEDURE DATE:  01/20/2020          INTERPRETATION:  MR PELVIS WITHOUT AND WITH IV CONTRAST dated 1/20/2020 2:34 PM     INDICATION: Pain. Ulcer.    COMPARISON: CT of the abdomen and pelvis dated 1/16/2020    TECHNIQUE: Multi-sequential, multiplanar MRI imaging of the pelvis was performed per standard protocol. Images were obtained before and after the administration of IV contrast.  Contrast: Gadavist. Administered: 10 cc. Discarded: 0 cc.    FINDINGS:    BONE MARROW: There is no fracture. There is no reactive bone marrow edema. No confluent decreased T1 marrow signal is present to suggest osteomyelitis. Preserved hip joint spaces. Mild narrowing the pubic symphysis.  SYNOVIUM/ JOINT FLUID: There is no joint effusion.  TENDONS: Mild bilateral proximal hamstring tendinosis. The remaining tendons are preserved.  MUSCLES: Normal muscle bulk. No muscle edema or enhancement.  NEUROVASCULAR STRUCTURES: Preserved  Intrapelvic soft tissues: Unremarkable.  SUBCUTANEOUS SOFT TISSUES: There is minimal posterior soft tissue swelling. No drainable fluid collection is appreciated.    IMPRESSION:    Minimal posterior soft tissue swelling may reflect mild cellulitis. No drainable fluid collection.        YARI POTTER M.D., ATTENDING RADIOLOGIST  This document has been electronically signed. Jan 20 2020  6:32PM                   ASSESSMENT AND PLAN:      presented with fever and severe low back pain; afebrile today  streptoccocal bacteremia ? sacral OM  MRI OF L-S SPINE RESULTS NOTED.    INCREASE NEURONTIN  MG BID  ANTIBIOTIC AS PER ID.  ANALGESIC.  Physical therapy evaluation.  Pain is accessed and addressed.  Plan of care was discussed with family. Questions answered.  Would continue to follow.
Neurology Follow up note    ZACH GUERRA59yMale    HPI:  pt with low back pain for last 2 days, pain with movement, saw PMD today, noted that he had a fever and blood in the urine, sent pt in for further evaluation.  pt denies any n/v/d, denies any abdominal pain.  reports only very low (sacral) midline back pain.  In ER patient was found to have disc herniation.  Patient is being admitted for further work up and treatment. (2020 18:01)      Interval History - doing better;     Patient is seen, chart was reviewed and case was discussed with the treatment team.  Pt is not in any distress.   Lying on bed comfortably.   No events reported overnight.   No clinical seizure was reported.    Vital Signs Last 24 Hrs  T(C): 36.3 (2020 12:37), Max: 37.3 (2020 20:10)  T(F): 97.3 (2020 12:37), Max: 99.1 (2020 20:10)  HR: 76 (2020 12:37) (67 - 78)  BP: 142/78 (2020 12:37) (128/78 - 142/78)  BP(mean): --  RR: 18 (2020 12:37) (17 - 18)  SpO2: 96% (2020 12:37) (96% - 98%)        REVIEW OF SYSTEMS:    Constitutional: No fever, weight loss or fatigue  Eyes: No eye pain, visual disturbances, or discharge  ENT:  No difficulty hearing, tinnitus, vertigo; No sinus or throat pain  Neck: No pain or stiffness  Respiratory: No cough, wheezing, chills or hemoptysis  Cardiovascular: No chest pain, palpitations,   Gastrointestinal: No abdominal or epigastric pain. No nausea, vomiting or hematemesis;  Genitourinary: No dysuria, frequency, hematuria or incontinence  Neurological: No headaches, memory loss, loss of strength, numbness or tremors  Psychiatric: No depression, anxiety, mood swings or difficulty sleeping  Musculoskeletal: No joint pain or swelling;  Skin: No itching, burning, rashes or lesions   Lymph Nodes: No enlarged glands  Endocrine: No heat or cold intolerance; No hair loss,  Allergy and Immunologic: No hives or eczema    On Neurological Examination:    Mental Status - Pt is alert, awake, oriented X3.Follows commands well and able to answer questions appropriately  .Mood and affect  normal    Speech -  Normal.     Cranial Nerves - Pupils 3 mm equal and reactive to light, extraocular eye movements intact.   Pt has no visual field deficit.  Pt has no  facial asymmetry. Facial sensation is intact.Tongue - is in midline.    Muscle tone - is normal all over. Moves all extremities equally.     Motor Exam - 5/5 all over, No drift. No shaking or tremors.    Sensory Exam - Pin prick, temperature, joint position and vibration are intact on either side. Pt withdraws all extremities equally on stimulation. No asymmetry seen. No complaints of tingling, numbness.    Gait - Able to stand and walk unassisted.        coordination:    Finger to nose: normal      Deep tendon Reflexes - 2 plus all over.       Neck Supple -  Yes.     MEDICATIONS    acetaminophen   Tablet .. 650 milliGRAM(s) Oral every 6 hours PRN  cefTRIAXone   IVPB 2000 milliGRAM(s) IV Intermittent every 24 hours  cyclobenzaprine 5 milliGRAM(s) Oral three times a day PRN  heparin  Injectable 5000 Unit(s) SubCutaneous every 8 hours  HYDROmorphone  Injectable 0.5 milliGRAM(s) IV Push four times a day PRN  lactobacillus acidophilus 1 Tablet(s) Oral three times a day  lidocaine   Patch 1 Patch Transdermal every 24 hours  metoprolol succinate ER 25 milliGRAM(s) Oral daily  morphine  - Injectable 2 milliGRAM(s) IV Push every 6 hours PRN  ondansetron Injectable 4 milliGRAM(s) IV Push every 6 hours PRN  oxyCODONE    IR 5 milliGRAM(s) Oral every 6 hours PRN  simvastatin 20 milliGRAM(s) Oral at bedtime  traMADol 25 milliGRAM(s) Oral every 6 hours PRN      Allergies    No Known Allergies    Intolerances                              12.8   8.44  )-----------( 160      ( 2020 13:57 )             37.9      : x    Urinalysis Basic - ( 2020 18:08 )    Color: Yellow / Appearance: Clear / S.005 / pH: x  Gluc: x / Ketone: Trace  / Bili: Negative / Urobili: Negative   Blood: x / Protein: 30 mg/dL / Nitrite: Negative   Leuk Esterase: Negative / RBC: 3-5 /HPF / WBC 0-2   Sq Epi: x / Non Sq Epi: Few / Bacteria: Occasional          Hemoglobin A1C:     Vitamin B12     RADIOLOGY  < from: MR Lumbar Spine w/wo IV Cont (20 @ 18:10) >    EXAM:  MR SPINE LUMBAR WAW IC                            PROCEDURE DATE:  2020          INTERPRETATION:  CLINICAL INDICATION: Back pain. Evaluate for epidural abscess and osteomyelitis.    TECHNIQUE: Multiplanar multisequence MRI of the lumbar spine was performed before and after the intravenous administration of 10 ml of Gadavist, according to standard protocol.    COMPARISON: CT abdomen and pelvis 2020.    FINDINGS:    ALIGNMENT: Straightening of the expected lumbar lordosis, without listhesis. There is a minimal levoconvex curvature to the lumbar spine.  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    VERTEBRAE: The vertebral bodies are normal in height. There is no acute fracture or aggressive osseous lesion.   There is a prominent Schmorl's node along the L5 superior endplate.    DISCS: There is degenerative loss of intervertebral disc space height particularly at L4-L5 level.    CONUS MEDULLARIS AND CAUDA EQUINA: The conus medullaris terminates at L1. There is normal appearance of the conus medullaris.    ENHANCEMENT: There is no evidence of abnormal enhancement.    PARAVERTEBRAL SOFT TISSUES AND VISUALIZED RETROPERITONEUM: The visualized paravertebral soft tissues appear within normal limits.  There are T2/STIR hyperintense renal cysts bilaterally, incompletely evaluated.    EVALUATION OF INDIVIDUAL LEVELS:   T12-L1: Mild disc bulge and degenerative changes of the bilateral facet joints resulting in moderate canal stenosis and mild-to-moderate left, mild right neuroforaminal narrowing.    L1-2: Disc bulge and degenerative changes of the bilateral facet joints, thickening of ligamentum flavum resulting in moderate to severe canal stenosis and compression of the central descending cauda equina nerve roots (series 6, image 14). There is also moderate neuroforaminal narrowing bilaterally.    L2-3: Disc bulge and degenerative changes of the bilateral facet joints, thickening of the ligamentum flavum resulting in moderate canal stenosis and moderate to severe right, moderate left neuroforaminal narrowing.    L3-4: Disc bulge and degenerative changes of the bilateral facet joints, thickening of the ligamentum flavum resulting in mild-to-moderate canal stenosis and severe neuroforaminal narrowing bilaterally, right greater than left.    L4-5: Disc bulge with superimposed disc protrusion and degenerative changes of the facet joints resulting in mild to moderate canal stenosis and severe neuroforaminal narrowing bilaterally.    L5-S1: Degenerative changes of the bilateral facet joints resulting in mild right neural foraminal narrowing. No canal stenosis or neural foraminal narrowing on the left.    LIMITED EVALUATION OF UPPER SACRUM AND SACROILIAC JOINTS: Mild degenerative changes of the sacroiliac joints.    IMPRESSION:     Multilevel degenerative changes of the lumbar spine superimposed on a congenitally narrow lumbar spinal canal particularly pronounced at L1-L2 and L2-L3, resulting in moderate to severe no stenosis and moderate canal stenosis, respectively. Correlation with symptoms and clinical exam findings is advised.        EMIL PAULSON M.D., ATTENDING RADIOLOGIST  This document has been electronically signed. 2020  6:31PM              ASSESSMENT AND PLAN:      presented with fever and severe low back pain; afebrile today  streptoccocal bacteremia ? sacral OM  MRI OF L-S SPINE RESULTS NOTED.    FOR MRI OF SACRUM  ANTIBIOTIC AS PER ID.  ANALGESIC.  Physical therapy evaluation.  Pain is accessed and addressed.  Plan of care was discussed with family. Questions answered.  Would continue to follow.
Neurology Follow up note    ZACH GUERRA59yMale    HPI:  pt with low back pain for last 2 days, pain with movement, saw PMD today, noted that he had a fever and blood in the urine, sent pt in for further evaluation.  pt denies any n/v/d, denies any abdominal pain.  reports only very low (sacral) midline back pain.  In ER patient was found to have disc herniation.  Patient is being admitted for further work up and treatment. (2020 18:01)      Interval History - doing better; still have back pain    Patient is seen, chart was reviewed and case was discussed with the treatment team.  Pt is not in any distress.   Lying on bed comfortably.   No events reported overnight.   No clinical seizure was reported.      Vital Signs Last 24 Hrs  T(C): 37.1 (2020 16:17), Max: 39.4 (2020 21:52)  T(F): 98.8 (2020 16:17), Max: 102.9 (2020 21:52)  HR: 81 (2020 12:40) (81 - 91)  BP: 120/78 (2020 12:40) (102/57 - 131/71)  BP(mean): --  RR: 18 (2020 12:40) (17 - 18)  SpO2: 96% (2020 12:40) (91% - 96%)        REVIEW OF SYSTEMS:    Constitutional: No fever, weight loss or fatigue  Eyes: No eye pain, visual disturbances, or discharge  ENT:  No difficulty hearing, tinnitus, vertigo; No sinus or throat pain  Neck: No pain or stiffness  Respiratory: No cough, wheezing, chills or hemoptysis  Cardiovascular: No chest pain, palpitations,   Gastrointestinal: No abdominal or epigastric pain. No nausea, vomiting or hematemesis;  Genitourinary: No dysuria, frequency, hematuria or incontinence  Neurological: No headaches, memory loss, loss of strength, numbness or tremors  Psychiatric: No depression, anxiety, mood swings or difficulty sleeping  Musculoskeletal: No joint pain or swelling;  Skin: No itching, burning, rashes or lesions   Lymph Nodes: No enlarged glands  Endocrine: No heat or cold intolerance; No hair loss,  Allergy and Immunologic: No hives or eczema    On Neurological Examination:    Mental Status - Pt is alert, awake, oriented X3.Follows commands well and able to answer questions appropriately  .Mood and affect  normal    Speech -  Normal.     Cranial Nerves - Pupils 3 mm equal and reactive to light, extraocular eye movements intact.   Pt has no visual field deficit.  Pt has no  facial asymmetry. Facial sensation is intact.Tongue - is in midline.    Muscle tone - is normal all over. Moves all extremities equally.     Motor Exam - 5/5 all over, No drift. No shaking or tremors.    Sensory Exam - Pin prick, temperature, joint position and vibration are intact on either side. Pt withdraws all extremities equally on stimulation. No asymmetry seen. No complaints of tingling, numbness.    Gait - Able to stand and walk unassisted.        coordination:    Finger to nose: normal      Deep tendon Reflexes - 2 plus all over.       Neck Supple -  Yes.     MEDICATIONS    acetaminophen   Tablet .. 650 milliGRAM(s) Oral every 6 hours PRN  cefTRIAXone   IVPB 2000 milliGRAM(s) IV Intermittent every 24 hours  cyclobenzaprine 5 milliGRAM(s) Oral three times a day PRN  heparin  Injectable 5000 Unit(s) SubCutaneous every 8 hours  HYDROmorphone  Injectable 0.5 milliGRAM(s) IV Push four times a day PRN  lactobacillus acidophilus 1 Tablet(s) Oral three times a day  lidocaine   Patch 1 Patch Transdermal every 24 hours  metoprolol succinate ER 25 milliGRAM(s) Oral daily  morphine  - Injectable 2 milliGRAM(s) IV Push every 6 hours PRN  ondansetron Injectable 4 milliGRAM(s) IV Push every 6 hours PRN  oxyCODONE    IR 5 milliGRAM(s) Oral every 6 hours PRN  simvastatin 20 milliGRAM(s) Oral at bedtime  traMADol 25 milliGRAM(s) Oral every 6 hours PRN      Allergies    No Known Allergies    Intolerances        LABS:  CBC Full  -  ( 2020 13:57 )  WBC Count : 8.44 K/uL  RBC Count : 4.47 M/uL  Hemoglobin : 12.8 g/dL  Hematocrit : 37.9 %  Platelet Count - Automated : 160 K/uL  Mean Cell Volume : 84.8 fl  Mean Cell Hemoglobin : 28.6 pg  Mean Cell Hemoglobin Concentration : 33.8 gm/dL  Auto Neutrophil # : x  Auto Lymphocyte # : x   : x    Urinalysis Basic - ( 2020 18:08 )    Color: Yellow / Appearance: Clear / S.005 / pH: x  Gluc: x / Ketone: Trace  / Bili: Negative / Urobili: Negative   Blood: x / Protein: 30 mg/dL / Nitrite: Negative   Leuk Esterase: Negative / RBC: 3-5 /HPF / WBC 0-2   Sq Epi: x / Non Sq Epi: Few / Bacteria: Occasional          134<L>  |  99  |  20  ----------------------------<  120<H>  3.5   |  27  |  0.90    Ca    8.5      2020 13:57      Hemoglobin A1C:     Vitamin B12     RADIOLOGY  < from: MR Lumbar Spine w/wo IV Cont (20 @ 18:10) >    EXAM:  MR SPINE LUMBAR WAW IC                            PROCEDURE DATE:  2020          INTERPRETATION:  CLINICAL INDICATION: Back pain. Evaluate for epidural abscess and osteomyelitis.    TECHNIQUE: Multiplanar multisequence MRI of the lumbar spine was performed before and after the intravenous administration of 10 ml of Gadavist, according to standard protocol.    COMPARISON: CT abdomen and pelvis 2020.    FINDINGS:    ALIGNMENT: Straightening of the expected lumbar lordosis, without listhesis. There is a minimal levoconvex curvature to the lumbar spine.  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    VERTEBRAE: The vertebral bodies are normal in height. There is no acute fracture or aggressive osseous lesion.   There is a prominent Schmorl's node along the L5 superior endplate.    DISCS: There is degenerative loss of intervertebral disc space height particularly at L4-L5 level.    CONUS MEDULLARIS AND CAUDA EQUINA: The conus medullaris terminates at L1. There is normal appearance of the conus medullaris.    ENHANCEMENT: There is no evidence of abnormal enhancement.    PARAVERTEBRAL SOFT TISSUES AND VISUALIZED RETROPERITONEUM: The visualized paravertebral soft tissues appear within normal limits.  There are T2/STIR hyperintense renal cysts bilaterally, incompletely evaluated.    EVALUATION OF INDIVIDUAL LEVELS:   T12-L1: Mild disc bulge and degenerative changes of the bilateral facet joints resulting in moderate canal stenosis and mild-to-moderate left, mild right neuroforaminal narrowing.    L1-2: Disc bulge and degenerative changes of the bilateral facet joints, thickening of ligamentum flavum resulting in moderate to severe canal stenosis and compression of the central descending cauda equina nerve roots (series 6, image 14). There is also moderate neuroforaminal narrowing bilaterally.    L2-3: Disc bulge and degenerative changes of the bilateral facet joints, thickening of the ligamentum flavum resulting in moderate canal stenosis and moderate to severe right, moderate left neuroforaminal narrowing.    L3-4: Disc bulge and degenerative changes of the bilateral facet joints, thickening of the ligamentum flavum resulting in mild-to-moderate canal stenosis and severe neuroforaminal narrowing bilaterally, right greater than left.    L4-5: Disc bulge with superimposed disc protrusion and degenerative changes of the facet joints resulting in mild to moderate canal stenosis and severe neuroforaminal narrowing bilaterally.    L5-S1: Degenerative changes of the bilateral facet joints resulting in mild right neural foraminal narrowing. No canal stenosis or neural foraminal narrowing on the left.    LIMITED EVALUATION OF UPPER SACRUM AND SACROILIAC JOINTS: Mild degenerative changes of the sacroiliac joints.    IMPRESSION:     Multilevel degenerative changes of the lumbar spine superimposed on a congenitally narrow lumbar spinal canal particularly pronounced at L1-L2 and L2-L3, resulting in moderate to severe no stenosis and moderate canal stenosis, respectively. Correlation with symptoms and clinical exam findings is advised.        EMIL PAULSON M.D., ATTENDING RADIOLOGIST  This document has been electronically signed. 2020  6:31PM              ASSESSMENT AND PLAN:      presented with fever and severe low back pain; afebrile today  streptoccocal bacteremia ? OM.  MRI OF L-S SPINE RESULTS NOTED.    ANTIBIOTIC AS PER ID.  ANALGESIC.  Physical therapy evaluation.  Pain is accessed and addressed.  Plan of care was discussed with family. Questions answered.  Would continue to follow.
Patient is a 59y old  Male who presents with a chief complaint of back pain (18 Jan 2020 16:53)      INTERVAL /OVERNIGHT EVENTS: no events over night.     MEDICATIONS  (STANDING):  cefTRIAXone   IVPB 2000 milliGRAM(s) IV Intermittent every 24 hours  heparin  Injectable 5000 Unit(s) SubCutaneous every 8 hours  lactobacillus acidophilus 1 Tablet(s) Oral three times a day  lidocaine   Patch 1 Patch Transdermal every 24 hours  metoprolol succinate ER 25 milliGRAM(s) Oral daily  simvastatin 20 milliGRAM(s) Oral at bedtime    MEDICATIONS  (PRN):  acetaminophen   Tablet .. 650 milliGRAM(s) Oral every 6 hours PRN Temp greater or equal to 38C (100.4F)  cyclobenzaprine 5 milliGRAM(s) Oral three times a day PRN Muscle Spasm  HYDROmorphone  Injectable 0.5 milliGRAM(s) IV Push four times a day PRN breakthrough pain  morphine  - Injectable 2 milliGRAM(s) IV Push every 6 hours PRN Severe Pain (7 - 10)  ondansetron Injectable 4 milliGRAM(s) IV Push every 6 hours PRN Nausea and/or Vomiting  oxyCODONE    IR 5 milliGRAM(s) Oral every 6 hours PRN Moderate Pain (4 - 6)  traMADol 25 milliGRAM(s) Oral every 6 hours PRN Mild Pain (1 - 3)  Allergies    No Known Allergies    Intolerances        REVIEW OF SYSTEMS:  CONSTITUTIONAL: No fever, weight loss, or fatigue  EYES: No eye pain, visual disturbances, or discharge  ENMT:  No difficulty hearing, tinnitus, vertigo; No sinus or throat pain  NECK: No pain or stiffness  RESPIRATORY: No cough, wheezing, chills or hemoptysis; No shortness of breath  CARDIOVASCULAR: No chest pain, palpitations, dizziness, or leg swelling  GASTROINTESTINAL: No abdominal or epigastric pain. No nausea, vomiting, or hematemesis; No diarrhea or constipation. No melena or hematochezia.  GENITOURINARY: No dysuria, frequency, hematuria, or incontinence  NEUROLOGICAL: No headaches, memory loss, loss of strength, numbness, or tremors  SKIN: No itching, burning, rashes, or lesions   LYMPH NODES: No enlarged glands  ENDOCRINE: No heat or cold intolerance; No hair loss; No polydipsia or polyuria  MUSCULOSKELETAL: No joint pain or swelling; No muscle, +backpain  PSYCHIATRIC: No depression, anxiety, mood swings, or difficulty sleeping  HEME/LYMPH: No easy bruising, or bleeding gums  ALLERGY AND IMMUNOLOGIC: No hives or eczema    Vital Signs Last 24 Hrs  T(C): 37.1 (18 Jan 2020 16:17), Max: 39.4 (17 Jan 2020 21:52)  T(F): 98.8 (18 Jan 2020 16:17), Max: 102.9 (17 Jan 2020 21:52)  HR: 81 (18 Jan 2020 12:40) (81 - 91)  BP: 120/78 (18 Jan 2020 12:40) (102/57 - 131/71)  BP(mean): --  RR: 18 (18 Jan 2020 12:40) (17 - 18)  SpO2: 96% (18 Jan 2020 12:40) (91% - 96%)    PHYSICAL EXAM:  GENERAL: NAD, well-groomed, well-developed  HEAD:  Atraumatic, Normocephalic  NECK: Supple, No JVD, Normal thyroid  NERVOUS SYSTEM:  Alert & Oriented X3, Good concentration; Motor Strength 5/5 B/L upper and lower extremities; DTRs 2+ intact and symmetric  CHEST/LUNG: Clear to auscultation bilaterally; No rales, rhonchi, wheezing, or rubs  HEART: Regular rate and rhythm; No murmurs, rubs, or gallops  ABDOMEN: Soft, Nontender, Nondistended; Bowel sounds present  EXTREMITIES:  2+ Peripheral Pulses, No clubbing, cyanosis, or edema  LYMPH: No lymphadenopathy noted  SKIN: No rashes or lesions                          12.8   8.44  )-----------( 160      ( 18 Jan 2020 13:57 )             37.9                   RADIOLOGY & ADDITIONAL TESTS:    Notes Reviewed:  [x ] YES  [ ] NO    Care Discussed with Consultants/Other Providers [x ] YES  [ ] NO
Patient is a 59y old  Male who presents with a chief complaint of back pain (18 Jan 2020 16:53)      INTERVAL /OVERNIGHT EVENTS: still febrile, now bacteremic    MEDICATIONS  (STANDING):  cefTRIAXone   IVPB 2000 milliGRAM(s) IV Intermittent every 24 hours  heparin  Injectable 5000 Unit(s) SubCutaneous every 8 hours  lactobacillus acidophilus 1 Tablet(s) Oral three times a day  lidocaine   Patch 1 Patch Transdermal every 24 hours  metoprolol succinate ER 25 milliGRAM(s) Oral daily  simvastatin 20 milliGRAM(s) Oral at bedtime    MEDICATIONS  (PRN):  acetaminophen   Tablet .. 650 milliGRAM(s) Oral every 6 hours PRN Temp greater or equal to 38C (100.4F)  cyclobenzaprine 5 milliGRAM(s) Oral three times a day PRN Muscle Spasm  HYDROmorphone  Injectable 0.5 milliGRAM(s) IV Push four times a day PRN breakthrough pain  morphine  - Injectable 2 milliGRAM(s) IV Push every 6 hours PRN Severe Pain (7 - 10)  ondansetron Injectable 4 milliGRAM(s) IV Push every 6 hours PRN Nausea and/or Vomiting  oxyCODONE    IR 5 milliGRAM(s) Oral every 6 hours PRN Moderate Pain (4 - 6)  traMADol 25 milliGRAM(s) Oral every 6 hours PRN Mild Pain (1 - 3)      Allergies    No Known Allergies    Intolerances        REVIEW OF SYSTEMS:  CONSTITUTIONAL: No fever, weight loss, or fatigue  EYES: No eye pain, visual disturbances, or discharge  ENMT:  No difficulty hearing, tinnitus, vertigo; No sinus or throat pain  NECK: No pain or stiffness  RESPIRATORY: No cough, wheezing, chills or hemoptysis; No shortness of breath  CARDIOVASCULAR: No chest pain, palpitations, dizziness, or leg swelling  GASTROINTESTINAL: No abdominal or epigastric pain. No nausea, vomiting, or hematemesis; No diarrhea or constipation. No melena or hematochezia.  GENITOURINARY: No dysuria, frequency, hematuria, or incontinence  NEUROLOGICAL: No headaches, memory loss, loss of strength, numbness, or tremors  SKIN: No itching, burning, rashes, or lesions   LYMPH NODES: No enlarged glands  ENDOCRINE: No heat or cold intolerance; No hair loss; No polydipsia or polyuria  MUSCULOSKELETAL: No joint pain or swelling; No muscle, +backpain  PSYCHIATRIC: No depression, anxiety, mood swings, or difficulty sleeping  HEME/LYMPH: No easy bruising, or bleeding gums  ALLERGY AND IMMUNOLOGIC: No hives or eczema    Vital Signs Last 24 Hrs  T(C): 37.1 (18 Jan 2020 16:17), Max: 39.4 (17 Jan 2020 21:52)  T(F): 98.8 (18 Jan 2020 16:17), Max: 102.9 (17 Jan 2020 21:52)  HR: 81 (18 Jan 2020 12:40) (81 - 91)  BP: 120/78 (18 Jan 2020 12:40) (102/57 - 131/71)  BP(mean): --  RR: 18 (18 Jan 2020 12:40) (17 - 18)  SpO2: 96% (18 Jan 2020 12:40) (91% - 96%)    PHYSICAL EXAM:  GENERAL: NAD, well-groomed, well-developed  HEAD:  Atraumatic, Normocephalic  EYES: EOMI, PERRLA, conjunctiva and sclera clear  ENMT: No tonsillar erythema, exudates, or enlargement; Moist mucous membranes, Good dentition, No lesions  NECK: Supple, No JVD, Normal thyroid  NERVOUS SYSTEM:  Alert & Oriented X3, Good concentration; Motor Strength 5/5 B/L upper and lower extremities; DTRs 2+ intact and symmetric  CHEST/LUNG: Clear to auscultation bilaterally; No rales, rhonchi, wheezing, or rubs  HEART: Regular rate and rhythm; No murmurs, rubs, or gallops  ABDOMEN: Soft, Nontender, Nondistended; Bowel sounds present  EXTREMITIES:  2+ Peripheral Pulses, No clubbing, cyanosis, or edema  LYMPH: No lymphadenopathy noted  SKIN: No rashes or lesions    LABS:                        12.8   8.44  )-----------( 160      ( 18 Jan 2020 13:57 )             37.9     18 Jan 2020 13:57    134    |  99     |  20     ----------------------------<  120    3.5     |  27     |  0.90     Ca    8.5        18 Jan 2020 13:57          CAPILLARY BLOOD GLUCOSE          RADIOLOGY & ADDITIONAL TESTS:    Notes Reviewed:  [x ] YES  [ ] NO    Care Discussed with Consultants/Other Providers [x ] YES  [ ] NO
Patient is a 59y old  Male who presents with a chief complaint of back pain (20 Jan 2020 11:30)      INTERVAL /OVERNIGHT EVENTS: still with back pain    MEDICATIONS  (STANDING):  cefTRIAXone   IVPB 2000 milliGRAM(s) IV Intermittent every 24 hours  heparin  Injectable 5000 Unit(s) SubCutaneous every 8 hours  lactobacillus acidophilus 1 Tablet(s) Oral three times a day  lidocaine   Patch 1 Patch Transdermal every 24 hours  metoprolol succinate ER 25 milliGRAM(s) Oral daily  simvastatin 20 milliGRAM(s) Oral at bedtime    MEDICATIONS  (PRN):  acetaminophen   Tablet .. 650 milliGRAM(s) Oral every 6 hours PRN Temp greater or equal to 38C (100.4F)  cyclobenzaprine 5 milliGRAM(s) Oral three times a day PRN Muscle Spasm  HYDROmorphone  Injectable 0.5 milliGRAM(s) IV Push four times a day PRN breakthrough pain  morphine  - Injectable 2 milliGRAM(s) IV Push every 4 hours PRN Severe Pain (7 - 10)  ondansetron Injectable 4 milliGRAM(s) IV Push every 6 hours PRN Nausea and/or Vomiting  oxyCODONE    IR 10 milliGRAM(s) Oral four times a day PRN Moderate Pain (4 - 6)  traMADol 50 milliGRAM(s) Oral four times a day PRN Mild Pain (1 - 3)      Allergies    No Known Allergies    Intolerances        REVIEW OF SYSTEMS:  CONSTITUTIONAL: No fever, weight loss, or fatigue  EYES: No eye pain, visual disturbances, or discharge  ENMT:  No difficulty hearing, tinnitus, vertigo; No sinus or throat pain  NECK: No pain or stiffness  RESPIRATORY: No cough, wheezing, chills or hemoptysis; No shortness of breath  CARDIOVASCULAR: No chest pain, palpitations, dizziness, or leg swelling  GASTROINTESTINAL: No abdominal or epigastric pain. No nausea, vomiting, or hematemesis; No diarrhea or constipation. No melena or hematochezia.  GENITOURINARY: No dysuria, frequency, hematuria, or incontinence  NEUROLOGICAL: No headaches, memory loss, loss of strength, numbness, or tremors  SKIN: No itching, burning, rashes, or lesions   LYMPH NODES: No enlarged glands  ENDOCRINE: No heat or cold intolerance; No hair loss; No polydipsia or polyuria  MUSCULOSKELETAL: No joint pain or swelling; No muscle, +backpain  PSYCHIATRIC: No depression, anxiety, mood swings, or difficulty sleeping  HEME/LYMPH: No easy bruising, or bleeding gums  ALLERGY AND IMMUNOLOGIC: No hives or eczema    Vital Signs Last 24 Hrs  T(C): 37.3 (20 Jan 2020 13:02), Max: 37.3 (20 Jan 2020 13:02)  T(F): 99.1 (20 Jan 2020 13:02), Max: 99.1 (20 Jan 2020 13:02)  HR: 73 (20 Jan 2020 13:02) (70 - 78)  BP: 116/78 (20 Jan 2020 13:02) (116/78 - 125/75)  BP(mean): --  RR: 17 (20 Jan 2020 13:02) (17 - 18)  SpO2: 95% (20 Jan 2020 13:02) (95% - 96%)    PHYSICAL EXAM:  GENERAL: NAD, well-groomed, well-developed  HEAD:  Atraumatic, Normocephalic  EYES: EOMI, PERRLA, conjunctiva and sclera clear  ENMT: No tonsillar erythema, exudates, or enlargement; Moist mucous membranes, Good dentition, No lesions  NECK: Supple, No JVD, Normal thyroid  NERVOUS SYSTEM:  Alert & Oriented X3, Good concentration; Motor Strength 5/5 B/L upper and lower extremities; DTRs 2+ intact and symmetric  CHEST/LUNG: Clear to auscultation bilaterally; No rales, rhonchi, wheezing, or rubs  HEART: Regular rate and rhythm; No murmurs, rubs, or gallops  ABDOMEN: Soft, Nontender, Nondistended; Bowel sounds present  EXTREMITIES:  2+ Peripheral Pulses, No clubbing, cyanosis, or edema  LYMPH: No lymphadenopathy noted  SKIN: No rashes or lesions    LABS:              CAPILLARY BLOOD GLUCOSE          RADIOLOGY & ADDITIONAL TESTS:    Notes Reviewed:  [x ] YES  [ ] NO    Care Discussed with Consultants/Other Providers [x ] YES  [ ] NO
Patient is a 59y old  Male who presents with a chief complaint of back pain (2020 11:11)      INTERVAL /OVERNIGHT EVENTS: back pain better    MEDICATIONS  (STANDING):  cefepime   IVPB      cefepime   IVPB 2000 milliGRAM(s) IV Intermittent every 8 hours  heparin  Injectable 5000 Unit(s) SubCutaneous every 8 hours  lactobacillus acidophilus 1 Tablet(s) Oral three times a day  lidocaine   Patch 1 Patch Transdermal every 24 hours  LORazepam     Tablet 1 milliGRAM(s) Oral once  metoprolol succinate ER 25 milliGRAM(s) Oral daily  simvastatin 20 milliGRAM(s) Oral at bedtime  vancomycin  IVPB 1500 milliGRAM(s) IV Intermittent every 12 hours    MEDICATIONS  (PRN):  acetaminophen   Tablet .. 650 milliGRAM(s) Oral every 6 hours PRN Temp greater or equal to 38C (100.4F)  cyclobenzaprine 5 milliGRAM(s) Oral three times a day PRN Muscle Spasm  HYDROmorphone  Injectable 0.5 milliGRAM(s) IV Push four times a day PRN breakthrough pain  morphine  - Injectable 2 milliGRAM(s) IV Push every 6 hours PRN Severe Pain (7 - 10)  oxyCODONE    IR 5 milliGRAM(s) Oral every 6 hours PRN Moderate Pain (4 - 6)  traMADol 25 milliGRAM(s) Oral every 6 hours PRN Mild Pain (1 - 3)      Allergies    No Known Allergies    Intolerances        REVIEW OF SYSTEMS:  CONSTITUTIONAL: No fever, weight loss, or fatigue  EYES: No eye pain, visual disturbances, or discharge  ENMT:  No difficulty hearing, tinnitus, vertigo; No sinus or throat pain  NECK: No pain or stiffness  RESPIRATORY: No cough, wheezing, chills or hemoptysis; No shortness of breath  CARDIOVASCULAR: No chest pain, palpitations, dizziness, or leg swelling  GASTROINTESTINAL: No abdominal or epigastric pain. No nausea, vomiting, or hematemesis; No diarrhea or constipation. No melena or hematochezia.  GENITOURINARY: No dysuria, frequency, hematuria, or incontinence  NEUROLOGICAL: No headaches, memory loss, loss of strength, numbness, or tremors  SKIN: No itching, burning, rashes, or lesions   LYMPH NODES: No enlarged glands  ENDOCRINE: No heat or cold intolerance; No hair loss; No polydipsia or polyuria  MUSCULOSKELETAL: No joint pain or swelling; No muscle, +backpain  PSYCHIATRIC: No depression, anxiety, mood swings, or difficulty sleeping  HEME/LYMPH: No easy bruising, or bleeding gums  ALLERGY AND IMMUNOLOGIC: No hives or eczema    Vital Signs Last 24 Hrs  T(C): 37.7 (2020 13:30), Max: 39.4 (2020 08:57)  T(F): 99.9 (2020 13:30), Max: 102.9 (2020 08:57)  HR: 95 (2020 13:30) (88 - 104)  BP: 117/71 (2020 13:30) (117/71 - 139/76)  BP(mean): --  RR: 18 (2020 13:30) (15 - 18)  SpO2: 93% (2020 13:30) (93% - 99%)    PHYSICAL EXAM:  GENERAL: NAD, well-groomed, well-developed  HEAD:  Atraumatic, Normocephalic  EYES: EOMI, PERRLA, conjunctiva and sclera clear  ENMT: No tonsillar erythema, exudates, or enlargement; Moist mucous membranes, Good dentition, No lesions  NECK: Supple, No JVD, Normal thyroid  NERVOUS SYSTEM:  Alert & Oriented X3, Good concentration; Motor Strength 5/5 B/L upper and lower extremities; DTRs 2+ intact and symmetric  CHEST/LUNG: Clear to auscultation bilaterally; No rales, rhonchi, wheezing, or rubs  HEART: Regular rate and rhythm; No murmurs, rubs, or gallops  ABDOMEN: Soft, Nontender, Nondistended; Bowel sounds present  EXTREMITIES:  2+ Peripheral Pulses, No clubbing, cyanosis, or edema  LYMPH: No lymphadenopathy noted  SKIN: No rashes or lesions    LABS:                        13.1   9.48  )-----------( 180      ( 2020 04:49 )             39.5       Ca    9.8        2020 15:28        Urinalysis Basic - ( 2020 18:08 )    Color: Yellow / Appearance: Clear / S.005 / pH: x  Gluc: x / Ketone: Trace  / Bili: Negative / Urobili: Negative   Blood: x / Protein: 30 mg/dL / Nitrite: Negative   Leuk Esterase: Negative / RBC: 3-5 /HPF / WBC 0-2   Sq Epi: x / Non Sq Epi: Few / Bacteria: Occasional      CAPILLARY BLOOD GLUCOSE          RADIOLOGY & ADDITIONAL TESTS:    Notes Reviewed:  [x ] YES  [ ] NO    Care Discussed with Consultants/Other Providers [x ] YES  [ ] NO
Patient is a 59y old  Male who presents with a chief complaint of back pain (21 Jan 2020 15:24)      INTERVAL /OVERNIGHT EVENTS: still with back pain    MEDICATIONS  (STANDING):  atorvastatin 20 milliGRAM(s) Oral at bedtime  cefTRIAXone   IVPB 2000 milliGRAM(s) IV Intermittent every 24 hours  chlorhexidine 4% Liquid 1 Application(s) Topical <User Schedule>  heparin  Injectable 5000 Unit(s) SubCutaneous every 8 hours  lactobacillus acidophilus 1 Tablet(s) Oral three times a day  lidocaine   Patch 1 Patch Transdermal every 24 hours  losartan 25 milliGRAM(s) Oral daily  metoprolol succinate ER 25 milliGRAM(s) Oral daily    MEDICATIONS  (PRN):  acetaminophen   Tablet .. 650 milliGRAM(s) Oral every 6 hours PRN Temp greater or equal to 38C (100.4F)  cyclobenzaprine 10 milliGRAM(s) Oral three times a day PRN Muscle Spasm  HYDROmorphone  Injectable 0.5 milliGRAM(s) IV Push four times a day PRN breakthrough pain  morphine  - Injectable 2 milliGRAM(s) IV Push every 3 hours PRN Severe Pain (7 - 10)  ondansetron Injectable 4 milliGRAM(s) IV Push every 6 hours PRN Nausea and/or Vomiting  oxyCODONE    IR 5 milliGRAM(s) Oral every 4 hours PRN Moderate Pain (4 - 6)  sodium chloride 0.9% lock flush 10 milliLiter(s) IV Push daily PRN Pre/post blood products, medications, blood draw, and to maintain line patency  traMADol 50 milliGRAM(s) Oral every 4 hours PRN Mild Pain (1 - 3)      Allergies    No Known Allergies    Intolerances        REVIEW OF SYSTEMS:  CONSTITUTIONAL: No fever, weight loss, or fatigue  EYES: No eye pain, visual disturbances, or discharge  ENMT:  No difficulty hearing, tinnitus, vertigo; No sinus or throat pain  NECK: No pain or stiffness  RESPIRATORY: No cough, wheezing, chills or hemoptysis; No shortness of breath  CARDIOVASCULAR: No chest pain, palpitations, dizziness, or leg swelling  GASTROINTESTINAL: No abdominal or epigastric pain. No nausea, vomiting, or hematemesis; No diarrhea or constipation. No melena or hematochezia.  GENITOURINARY: No dysuria, frequency, hematuria, or incontinence  NEUROLOGICAL: No headaches, memory loss, loss of strength, numbness, or tremors  SKIN: No itching, burning, rashes, or lesions   LYMPH NODES: No enlarged glands  ENDOCRINE: No heat or cold intolerance; No hair loss; No polydipsia or polyuria  MUSCULOSKELETAL: No joint pain or swelling; No muscle, +backpain  PSYCHIATRIC: No depression, anxiety, mood swings, or difficulty sleeping  HEME/LYMPH: No easy bruising, or bleeding gums  ALLERGY AND IMMUNOLOGIC: No hives or eczema    Vital Signs Last 24 Hrs  T(C): 36.7 (21 Jan 2020 13:02), Max: 37.2 (20 Jan 2020 21:07)  T(F): 98 (21 Jan 2020 13:02), Max: 99 (20 Jan 2020 21:07)  HR: 71 (21 Jan 2020 13:02) (66 - 75)  BP: 116/74 (21 Jan 2020 13:02) (111/69 - 116/74)  BP(mean): --  RR: 18 (21 Jan 2020 13:02) (18 - 18)  SpO2: 96% (21 Jan 2020 13:02) (96% - 97%)    PHYSICAL EXAM:  GENERAL: NAD, well-groomed, well-developed  HEAD:  Atraumatic, Normocephalic  EYES: EOMI, PERRLA, conjunctiva and sclera clear  ENMT: No tonsillar erythema, exudates, or enlargement; Moist mucous membranes, Good dentition, No lesions  NECK: Supple, No JVD, Normal thyroid  NERVOUS SYSTEM:  Alert & Oriented X3, Good concentration; Motor Strength 5/5 B/L upper and lower extremities; DTRs 2+ intact and symmetric  CHEST/LUNG: Clear to auscultation bilaterally; No rales, rhonchi, wheezing, or rubs  HEART: Regular rate and rhythm; No murmurs, rubs, or gallops  ABDOMEN: Soft, Nontender, Nondistended; Bowel sounds present  EXTREMITIES:  2+ Peripheral Pulses, No clubbing, cyanosis, or edema  LYMPH: No lymphadenopathy noted  SKIN: No rashes or lesions    LABS:              CAPILLARY BLOOD GLUCOSE          RADIOLOGY & ADDITIONAL TESTS:    Notes Reviewed:  [x ] YES  [ ] NO    Care Discussed with Consultants/Other Providers [x ] YES  [ ] NO
infectious diseases progress note:    ZACH GUERRA is a 59y y. o. Male patient    Patient reports: "just worried about pain control going home"    ROS:    EYES:  Negative  blurry vision or double vision  GASTROINTESTINAL:  Negative for nausea, vomiting, diarrhea  -otherwise negative except for subjective    Allergies    No Known Allergies    Intolerances        ANTIBIOTICS/RELEVANT:  antimicrobials  cefTRIAXone   IVPB 2000 milliGRAM(s) IV Intermittent every 24 hours    immunologic:    OTHER:  acetaminophen   Tablet .. 650 milliGRAM(s) Oral every 6 hours PRN  atorvastatin 20 milliGRAM(s) Oral at bedtime  chlorhexidine 4% Liquid 1 Application(s) Topical <User Schedule>  cyclobenzaprine 10 milliGRAM(s) Oral three times a day PRN  gabapentin 300 milliGRAM(s) Oral two times a day  heparin  Injectable 5000 Unit(s) SubCutaneous every 8 hours  HYDROmorphone  Injectable 0.5 milliGRAM(s) IV Push four times a day PRN  lactobacillus acidophilus 1 Tablet(s) Oral three times a day  lidocaine   Patch 1 Patch Transdermal every 24 hours  losartan 25 milliGRAM(s) Oral daily  metoprolol succinate ER 25 milliGRAM(s) Oral daily  morphine  - Injectable 2 milliGRAM(s) IV Push every 3 hours PRN  ondansetron Injectable 4 milliGRAM(s) IV Push every 6 hours PRN  oxyCODONE    IR 5 milliGRAM(s) Oral every 4 hours PRN  sodium chloride 0.9% lock flush 10 milliLiter(s) IV Push daily PRN  traMADol 50 milliGRAM(s) Oral every 4 hours PRN      Objective:  Vital Signs Last 24 Hrs  T(C): 36.8 (22 Jan 2020 05:08), Max: 37 (21 Jan 2020 21:14)  T(F): 98.2 (22 Jan 2020 05:08), Max: 98.6 (21 Jan 2020 21:14)  HR: 70 (22 Jan 2020 05:08) (70 - 74)  BP: 117/70 (22 Jan 2020 05:08) (113/65 - 117/70)  BP(mean): --  RR: 17 (22 Jan 2020 05:08) (17 - 18)  SpO2: 97% (22 Jan 2020 05:08) (95% - 97%)    T(C): 36.8 (01-22-20 @ 05:08), Max: 37.3 (01-20-20 @ 13:02)  T(C): 36.8 (01-22-20 @ 05:08), Max: 37.3 (01-20-20 @ 13:02)  T(C): 36.8 (01-22-20 @ 05:08), Max: 37.4 (01-18-20 @ 12:40)    PHYSICAL EXAM:  Constitutional: Well-developed, well nourished  Eyes: PERRLA, EOMI  Ear/Nose/Throat: noted missing tooth posterior lower right  Neck: no JVD, no lymphadenopathy, supple  Respiratory: no accessory muscle use  Cardiovascular: RRR,   Gastrointestinal: soft, NT  Extremities: no clubbing, no cyanosis, edema absent  Skin: tender over sacral spine cantral but no obv overlying erythema      LABS:      8.44 01-18 @ 13:57  9.48 01-17 @ 04:49  12.98 01-16 @ 15:28              Creatinine, Serum: 0.90 mg/dL (01-18-20 @ 13:57)  Creatinine, Serum: 1.10 mg/dL (01-16-20 @ 15:28)                MICROBIOLOGY:              RADIOLOGY & ADDITIONAL STUDIES:
infectious diseases progress note:    ZACH GUERRA is a 59y y. o. Male patient    Patient reports: "still have the pain in the lower back"    ROS:    EYES:  Negative  blurry vision or double vision  GASTROINTESTINAL:  Negative for nausea, vomiting, diarrhea  -otherwise negative except for subjective    Allergies    No Known Allergies    Intolerances        ANTIBIOTICS/RELEVANT:  antimicrobials  cefTRIAXone   IVPB 2000 milliGRAM(s) IV Intermittent every 24 hours    immunologic:    OTHER:  acetaminophen   Tablet .. 650 milliGRAM(s) Oral every 6 hours PRN  cyclobenzaprine 5 milliGRAM(s) Oral three times a day PRN  heparin  Injectable 5000 Unit(s) SubCutaneous every 8 hours  HYDROmorphone  Injectable 0.5 milliGRAM(s) IV Push four times a day PRN  lactobacillus acidophilus 1 Tablet(s) Oral three times a day  lidocaine   Patch 1 Patch Transdermal every 24 hours  metoprolol succinate ER 25 milliGRAM(s) Oral daily  morphine  - Injectable 2 milliGRAM(s) IV Push every 4 hours PRN  ondansetron Injectable 4 milliGRAM(s) IV Push every 6 hours PRN  oxyCODONE    IR 10 milliGRAM(s) Oral four times a day PRN  simvastatin 20 milliGRAM(s) Oral at bedtime  traMADol 50 milliGRAM(s) Oral four times a day PRN      Objective:  Vital Signs Last 24 Hrs  T(C): 36.5 (21 Jan 2020 04:43), Max: 37.3 (20 Jan 2020 13:02)  T(F): 97.7 (21 Jan 2020 04:43), Max: 99.1 (20 Jan 2020 13:02)  HR: 66 (21 Jan 2020 04:43) (66 - 75)  BP: 112/75 (21 Jan 2020 04:43) (111/69 - 116/78)  BP(mean): --  RR: 18 (20 Jan 2020 21:07) (17 - 18)  SpO2: 97% (20 Jan 2020 21:07) (95% - 97%)    T(C): 36.5 (01-21-20 @ 04:43), Max: 37.3 (01-20-20 @ 13:02)  T(C): 36.5 (01-21-20 @ 04:43), Max: 37.4 (01-18-20 @ 12:40)  T(C): 36.5 (01-21-20 @ 04:43), Max: 39.4 (01-17-20 @ 21:52)    PHYSICAL EXAM:  Constitutional: Well-developed, well nourished  Eyes: PERRLA, EOMI  Ear/Nose/Throat: oropharynx normal	  Neck: no JVD, no lymphadenopathy, supple  Respiratory: no accessory muscle use  Cardiovascular: RRR,   Gastrointestinal: soft, NT  Extremities: no clubbing, no cyanosis, edema absent      LABS:      8.44 01-18 @ 13:57  9.48 01-17 @ 04:49  12.98 01-16 @ 15:28      Creatinine, Serum: 0.90 mg/dL (01-18-20 @ 13:57)  Creatinine, Serum: 1.10 mg/dL (01-16-20 @ 15:28)      MICROBIOLOGY:      RADIOLOGY & ADDITIONAL STUDIES:  < from: MR Pelvis w/wo IV Cont (01.20.20 @ 14:34) >    EXAM:  MR PELVIS WAW IC                            PROCEDURE DATE:  01/20/2020          INTERPRETATION:  MR PELVIS WITHOUT AND WITH IV CONTRAST dated 1/20/2020 2:34 PM     INDICATION: Pain. Ulcer.    COMPARISON: CT of the abdomen and pelvis dated 1/16/2020    TECHNIQUE: Multi-sequential, multiplanar MRI imaging of the pelvis was performed per standard protocol. Images were obtained before and after the administration of IV contrast.  Contrast: Gadavist. Administered: 10 cc. Discarded: 0 cc.    FINDINGS:    BONE MARROW: There is no fracture. There is no reactive bone marrow edema. No confluent decreased T1 marrow signal is present to suggest osteomyelitis. Preserved hip joint spaces. Mild narrowing the pubic symphysis.  SYNOVIUM/ JOINT FLUID: There is no joint effusion.  TENDONS: Mild bilateral proximal hamstring tendinosis. The remaining tendons are preserved.  MUSCLES: Normal muscle bulk. No muscle edema or enhancement.  NEUROVASCULAR STRUCTURES: Preserved  Intrapelvic soft tissues: Unremarkable.  SUBCUTANEOUS SOFT TISSUES: There is minimal posterior soft tissue swelling. No drainable fluid collection is appreciated.    IMPRESSION:    Minimal posterior soft tissue swelling may reflect mild cellulitis. No drainable fluid collection.    < from: TTE Echo Doppler w/o Cont (01.18.20 @ 10:23) >     EXAM:  ECHO TTE WO CON COMP W DOPPLR         PROCEDURE DATE:  01/18/2020        INTERPRETATION:  INDICATION: Abnormal EKG  Sonographer PH    Blood Pressure 102/57    Height 182 cm     Weight 99.9 kg       BSA 2.2 sq m    Dimensions:    LA 3.1 Normal Values: 2.0 - 4.0 cm    Ao 3.5        Normal Values: 2.0 - 3.8 cm  SEPTUM 1.0       Normal Values: 0.6 - 1.2 cm  PWT 0.9       Normal Values: 0.6 - 1.1 cm  LVIDd 4.8         Normal Values: 3.0 - 5.6 cm  LVIDs 3.0         Normal Values: 1.8 - 4.0 cm      OBSERVATIONS:    Mitral Valve: normal, trace physiologic MR.  Aortic Valve/Aorta: normal trileaflet aortic valve.  Tricuspid Valve: normal with trace TR.  Pulmonic Valve: Not well-visualized  Left Atrium: normal  Right Atrium: normal  LeftVentricle: normal LV size and systolic function, estimated LVEF of 60%.  Right Ventricle: normal size and systolic function.  Pericardium/Pleura: normal, no significant pericardial effusion.  Pulmonary/RV Pressure: estimated PA systolic pressure of 20 mmHg    Conclusion:     Normal left ventricular internal dimensions and systolic function, estimated LVEF of 60%.   Normal RV size and systolic function.   Normal trileaflet aortic valve, without AI.   Trace physiologic MR and TR.    Estimated PA systolic pressure of 20 mmHg.  No significant pericardial effusion.
infectious diseases progress note:    ZACH GUERRA is a 59y y. o. Male patient    Patient reports: "when will the pain improve"    ROS:    EYES:  Negative  blurry vision or double vision  GASTROINTESTINAL:  Negative for nausea, vomiting, diarrhea  -otherwise negative except for subjective    Allergies    No Known Allergies    Intolerances        ANTIBIOTICS/RELEVANT:  antimicrobials  cefTRIAXone   IVPB 2000 milliGRAM(s) IV Intermittent every 24 hours    immunologic:    OTHER:  acetaminophen   Tablet .. 650 milliGRAM(s) Oral every 6 hours PRN  cyclobenzaprine 5 milliGRAM(s) Oral three times a day PRN  heparin  Injectable 5000 Unit(s) SubCutaneous every 8 hours  HYDROmorphone  Injectable 0.5 milliGRAM(s) IV Push four times a day PRN  lactobacillus acidophilus 1 Tablet(s) Oral three times a day  lidocaine   Patch 1 Patch Transdermal every 24 hours  metoprolol succinate ER 25 milliGRAM(s) Oral daily  morphine  - Injectable 2 milliGRAM(s) IV Push every 6 hours PRN  ondansetron Injectable 4 milliGRAM(s) IV Push every 6 hours PRN  oxyCODONE    IR 5 milliGRAM(s) Oral every 6 hours PRN  simvastatin 20 milliGRAM(s) Oral at bedtime  traMADol 25 milliGRAM(s) Oral every 6 hours PRN      Objective:  Vital Signs Last 24 Hrs  T(C): 37 (2020 11:07), Max: 39.4 (2020 21:52)  T(F): 98.6 (2020 11:07), Max: 102.9 (2020 21:52)  HR: 85 (2020 05:07) (85 - 95)  BP: 102/57 (2020 05:07) (102/57 - 131/71)  BP(mean): --  RR: 17 (2020 05:07) (17 - 18)  SpO2: 95% (2020 05:07) (91% - 95%)    T(C): 37 (20 @ 11:07), Max: 39.4 (20 @ 08:57)  T(C): 37 (20 @ 11:07), Max: 39.4 (20 @ 08:57)  T(C): 37 (20 @ 11:07), Max: 39.4 (20 @ 08:57)    PHYSICAL EXAM:  Constitutional: Well-developed, well nourished  Eyes: PERRLA, EOMI  Ear/Nose/Throat: oropharynx normal	  Neck: no JVD, no lymphadenopathy, supple  Respiratory: no accessory muscle use  Cardiovascular: RRR,   Gastrointestinal: soft, NT  Extremities: no clubbing, no cyanosis, edema absent      LABS:                        13.1   9.48  )-----------( 180      ( 2020 04:49 )             39.5       9.48  @ 04:49  12.98  @ 15:28          136  |  101  |  13  ----------------------------<  109<H>  4.2   |  27  |  1.10    Ca    9.8      2020 15:28    TPro  8.4<H>  /  Alb  4.0  /  TBili  1.3<H>  /  DBili  x   /  AST  28  /  ALT  34  /  AlkPhos  70        Creatinine, Serum: 1.10 mg/dL (20 @ 15:28)        Urinalysis Basic - ( 2020 18:08 )    Color: Yellow / Appearance: Clear / S.005 / pH: x  Gluc: x / Ketone: Trace  / Bili: Negative / Urobili: Negative   Blood: x / Protein: 30 mg/dL / Nitrite: Negative   Leuk Esterase: Negative / RBC: 3-5 /HPF / WBC 0-2   Sq Epi: x / Non Sq Epi: Few / Bacteria: Occasional      MICROBIOLOGY:  Culture Results:   Growth in aerobic bottle: Gram Positive Cocci in Pairs and Chains  Growth in anaerobic bottle: Gram Positive Cocci in Pairs and Chains ( @ 15:35)  Culture Results:   Growth in anaerobic bottle: Gram Positive Cocci in Pairs and Chains  Growth in aerobic bottle: Gram Positive Cocci in Pairs and Chains ( @ 15:35)      Culture - Blood (20 @ 22:01)    -  Streptococcus sp. (Not Grp A, B or S pneumoniae): Detec    Gram Stain:   Growth in aerobic bottle: Gram Positive Cocci in Pairs and Chains  Growth in anaerobic bottle: Gram Positive Cocci in Pairs and Chains    Specimen Source: .Blood Blood    Organism: Blood Culture PCR    Culture Results:   Growth in aerobic bottle: Alpha hemolytic strep      RADIOLOGY & ADDITIONAL STUDIES:    < from: MR Lumbar Spine w/wo IV Cont (20 @ 18:10) >    EXAM:  MR SPINE LUMBAR WAW IC                            PROCEDURE DATE:  2020          INTERPRETATION:  CLINICAL INDICATION: Back pain. Evaluate for epidural abscess and osteomyelitis.    TECHNIQUE: Multiplanar multisequence MRI of the lumbar spine was performed before and after the intravenous administration of 10 ml of Gadavist, according to standard protocol.    COMPARISON: CT abdomen and pelvis 2020.    FINDINGS:    ALIGNMENT: Straightening of the expected lumbar lordosis, without listhesis. There is a minimal levoconvex curvature to the lumbar spine.  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    VERTEBRAE: The vertebral bodies are normal in height. There is no acute fracture or aggressive osseous lesion.   There is a prominent Schmorl's node along the L5 superior endplate.    DISCS: There is degenerative loss of intervertebral disc space height particularly at L4-L5 level.    CONUS MEDULLARIS AND CAUDA EQUINA: The conus medullaris terminates at L1. There is normal appearance of the conus medullaris.    ENHANCEMENT: There is no evidence of abnormal enhancement.    PARAVERTEBRAL SOFT TISSUES AND VISUALIZED RETROPERITONEUM: The visualized paravertebral soft tissues appear within normal limits.  There are T2/STIR hyperintense renal cysts bilaterally, incompletely evaluated.    EVALUATION OF INDIVIDUAL LEVELS:   T12-L1: Mild disc bulge and degenerative changes of the bilateral facet joints resulting in moderate canal stenosis and mild-to-moderate left, mild right neuroforaminal narrowing.    L1-2: Disc bulge and degenerative changes of the bilateral facet joints, thickening of ligamentum flavum resulting in moderate to severe canal stenosis and compression of the central descending cauda equina nerve roots (series 6, image 14). There is also moderate neuroforaminal narrowing bilaterally.    L2-3: Disc bulge and degenerative changes of the bilateral facet joints, thickening of the ligamentum flavum resulting in moderate canal stenosis and moderate to severe right, moderate left neuroforaminal narrowing.    L3-4: Disc bulge and degenerative changes of the bilateral facet joints, thickening of the ligamentum flavum resulting in mild-to-moderate canal stenosis and severe neuroforaminal narrowing bilaterally, right greater than left.    L4-5: Disc bulge with superimposed disc protrusion and degenerative changes of the facet joints resulting in mild to moderate canal stenosis and severe neuroforaminal narrowing bilaterally.    L5-S1: Degenerative changes of the bilateral facet joints resulting in mild right neural foraminal narrowing. No canal stenosis or neural foraminal narrowing on the left.    LIMITED EVALUATION OF UPPER SACRUM AND SACROILIAC JOINTS: Mild degenerative changes of the sacroiliac joints.    IMPRESSION:     Multilevel degenerative changes of the lumbar spine superimposed on a congenitally narrow lumbar spinal canal particularly pronounced at L1-L2 and L2-L3, resulting in moderate to severe no stenosis and moderate canal stenosis, respectively. Correlation with symptoms and clinical exam findings is advised.
infectious diseases progress note:    ZACH GUERRA is a 59y y. o. Male patient    Patient reports: feeling better with improved pain    ROS:    EYES:  Negative  blurry vision or double vision  GASTROINTESTINAL:  Negative for nausea, vomiting, diarrhea  -otherwise negative except for subjective    Allergies    No Known Allergies    Intolerances        ANTIBIOTICS/RELEVANT:  antimicrobials  cefTRIAXone   IVPB 2000 milliGRAM(s) IV Intermittent every 24 hours    immunologic:    OTHER:  acetaminophen   Tablet .. 650 milliGRAM(s) Oral every 6 hours PRN  cyclobenzaprine 5 milliGRAM(s) Oral three times a day PRN  heparin  Injectable 5000 Unit(s) SubCutaneous every 8 hours  HYDROmorphone  Injectable 0.5 milliGRAM(s) IV Push four times a day PRN  lactobacillus acidophilus 1 Tablet(s) Oral three times a day  lidocaine   Patch 1 Patch Transdermal every 24 hours  metoprolol succinate ER 25 milliGRAM(s) Oral daily  morphine  - Injectable 2 milliGRAM(s) IV Push every 6 hours PRN  ondansetron Injectable 4 milliGRAM(s) IV Push every 6 hours PRN  oxyCODONE    IR 5 milliGRAM(s) Oral every 6 hours PRN  simvastatin 20 milliGRAM(s) Oral at bedtime  traMADol 25 milliGRAM(s) Oral every 6 hours PRN      Objective:  Last 24-Vital Signs Last 24 Hrs  T(C): 36.6 (19 Jan 2020 05:15), Max: 37.4 (18 Jan 2020 12:40)  T(F): 97.9 (19 Jan 2020 05:15), Max: 99.4 (18 Jan 2020 12:40)  HR: 67 (19 Jan 2020 05:15) (67 - 81)  BP: 128/78 (19 Jan 2020 05:15) (120/78 - 132/81)  BP(mean): --  RR: 17 (19 Jan 2020 05:15) (17 - 18)  SpO2: 98% (19 Jan 2020 05:15) (96% - 98%)    T(C): 36.6 (01-19-20 @ 05:15), Max: 39.4 (01-17-20 @ 21:52)  T(F): 97.9 (01-19-20 @ 05:15), Max: 102.9 (01-17-20 @ 21:52)  T(C): 36.6 (01-19-20 @ 05:15), Max: 39.4 (01-17-20 @ 08:57)  T(F): 97.9 (01-19-20 @ 05:15), Max: 102.9 (01-17-20 @ 08:57)  T(C): 36.6 (01-19-20 @ 05:15), Max: 39.4 (01-17-20 @ 08:57)  T(F): 97.9 (01-19-20 @ 05:15), Max: 102.9 (01-17-20 @ 08:57)    PHYSICAL EXAM:  Constitutional: Well-developed, well nourished  Eyes: PERRLA, EOMI  Ear/Nose/Throat: oropharynx normal	  Neck: no JVD, no lymphadenopathy, supple  Respiratory: no accessory muscle use, lung fields bilaterally clear  Cardiovascular: RRR, normal S1, S2 no m/r/g  Gastrointestinal: soft, NT, no HSM, BS-normal  Extremities: no clubbing, no cyanosis, edema absent  Neuro: patient alert, oriented and appropriate  Skin: no sig lesions      LABS:                        12.8   8.44  )-----------( 160      ( 18 Jan 2020 13:57 )             37.9       WBC 8.44  01-18 @ 13:57  WBC 9.48  01-17 @ 04:49  WBC 12.98  01-16 @ 15:28      01-18    134<L>  |  99  |  20  ----------------------------<  120<H>  3.5   |  27  |  0.90    Ca    8.5      18 Jan 2020 13:57        Creatinine, Serum: 0.90 mg/dL (01-18-20 @ 13:57)  Creatinine, Serum: 1.10 mg/dL (01-16-20 @ 15:28)                MICROBIOLOGY:    Culture - Blood (01.17.20 @ 21:10)    Gram Stain:   Growth in aerobic bottle: Gram Positive Cocci in Pairs and Chains  Growth in anaerobic bottle: Gram Positive Cocci in Pairs and Chains    Specimen Source: .Blood Blood    Culture Results:   Growth in aerobic bottle: Gram Positive Cocci in Pairs and Chains  Growth in anaerobic bottle: Gram Positive Cocci in Pairs and Chains    Culture - Blood (01.16.20 @ 22:01)    Gram Stain:   Growth in aerobic bottle: Gram Positive Cocci in Pairs and Chains  Growth in anaerobic bottle: Gram Positive Cocci in Pairs and Chains    Specimen Source: .Blood Blood    Culture Results:   Growth in aerobic and anaerobic bottles: Streptococcus anginosus  Susceptibility to follow.        RADIOLOGY & ADDITIONAL STUDIES:
infectious diseases progress note:    ZACH GUERRA is a 59y y. o. Male patient    Patient reports: pain was a little worse last night and the pain pills only last about 3 1/2 hours so this every 6 hours is not really working    ROS:    EYES:  Negative  blurry vision or double vision  GASTROINTESTINAL:  Negative for nausea, vomiting, diarrhea  -otherwise negative except for subjective    Allergies    No Known Allergies    Intolerances        ANTIBIOTICS/RELEVANT:  antimicrobials  cefTRIAXone   IVPB 2000 milliGRAM(s) IV Intermittent every 24 hours    immunologic:    OTHER:  acetaminophen   Tablet .. 650 milliGRAM(s) Oral every 6 hours PRN  cyclobenzaprine 5 milliGRAM(s) Oral three times a day PRN  heparin  Injectable 5000 Unit(s) SubCutaneous every 8 hours  HYDROmorphone  Injectable 0.5 milliGRAM(s) IV Push four times a day PRN  lactobacillus acidophilus 1 Tablet(s) Oral three times a day  lidocaine   Patch 1 Patch Transdermal every 24 hours  LORazepam     Tablet 1 milliGRAM(s) Oral once  metoprolol succinate ER 25 milliGRAM(s) Oral daily  morphine  - Injectable 2 milliGRAM(s) IV Push every 6 hours PRN  ondansetron Injectable 4 milliGRAM(s) IV Push every 6 hours PRN  oxyCODONE    IR 5 milliGRAM(s) Oral every 6 hours PRN  simvastatin 20 milliGRAM(s) Oral at bedtime  traMADol 25 milliGRAM(s) Oral every 6 hours PRN      Objective:  Vital Signs Last 24 Hrs  T(C): 36.7 (20 Jan 2020 05:25), Max: 37.2 (19 Jan 2020 21:19)  T(F): 98.1 (20 Jan 2020 05:25), Max: 99 (19 Jan 2020 21:19)  HR: 70 (20 Jan 2020 05:25) (70 - 78)  BP: 118/73 (20 Jan 2020 05:25) (118/73 - 142/78)  BP(mean): --  RR: 17 (20 Jan 2020 05:25) (17 - 18)  SpO2: 96% (20 Jan 2020 05:25) (96% - 96%)    T(C): 36.7 (01-20-20 @ 05:25), Max: 37.4 (01-18-20 @ 12:40)  T(C): 36.7 (01-20-20 @ 05:25), Max: 39.4 (01-17-20 @ 21:52)  T(C): 36.7 (01-20-20 @ 05:25), Max: 39.4 (01-17-20 @ 08:57)    PHYSICAL EXAM:  Constitutional: Well-developed, well nourished  Eyes: PERRLA, EOMI  Ear/Nose/Throat: oropharynx normal	  Neck: no JVD, no lymphadenopathy, supple  Respiratory: no accessory muscle use  Cardiovascular: RRR,   Gastrointestinal: soft, NT  Extremities: no clubbing, no cyanosis, edema absent      LABS:                        12.8   8.44  )-----------( 160      ( 18 Jan 2020 13:57 )             37.9       8.44 01-18 @ 13:57  9.48 01-17 @ 04:49  12.98 01-16 @ 15:28      01-18    134<L>  |  99  |  20  ----------------------------<  120<H>  3.5   |  27  |  0.90    Ca    8.5      18 Jan 2020 13:57        Creatinine, Serum: 0.90 mg/dL (01-18-20 @ 13:57)  Creatinine, Serum: 1.10 mg/dL (01-16-20 @ 15:28)                MICROBIOLOGY:    Culture - Blood in AM (01.18.20 @ 12:42)    Specimen Source: .Blood Blood-Peripheral    Culture Results:   No growth to date.    Culture - Blood (01.16.20 @ 22:01)    Gram Stain:   Growth in aerobic bottle: Gram Positive Cocci in Pairs and Chains  Growth in anaerobic bottle: Gram Positive Cocci in Pairs and Chains    -  Ceftriaxone: S 0.5    -  Clindamycin: S    -  Erythromycin: S    -  Levofloxacin: S    -  Penicillin: S 0.064    -  Vancomycin: S    Specimen Source: .Blood Blood    Organism: Streptococcus anginosus    Organism: Streptococcus anginosus    Culture Results:   Growth in aerobic and anaerobic bottles: Streptococcus anginosus    Organism Identification: Streptococcus anginosus    Method Type: ETEST    Method Type: BRETT        RADIOLOGY & ADDITIONAL STUDIES:
neuro cons dict.  severe low back pain with fever concerning for   discitis/osteomyelitis.  mri of l-s spine with contrast  ativan 1-2 mg po 45 minutes prior to mri  antibiotic as per ID.  Analgesic.
moist

## 2020-01-22 NOTE — PROGRESS NOTE ADULT - PROBLEM SELECTOR PLAN 2
pan culture  ID eval with Dr. JOSHI appreciated  tylenol for fever control, resolved
pan culture  ID eval with Dr. JOSHI appreciated  tylenol for fever control, resolved
pan culture  ID eval with Dr. MADELYN meier for fever control
pan culture  ID lisa with Dr. JOSHI appreciated  tylenol for fever control
pan culture  ID lisa with Dr. JOSHI appreciated  tylenol for fever control
anticipate some continuing in this clinical context despite appropriate abx
as above
will follow
will follow

## 2020-01-22 NOTE — PROGRESS NOTE ADULT - ASSESSMENT
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

## 2020-01-22 NOTE — PROGRESS NOTE ADULT - PROBLEM SELECTOR PLAN 1
? etiology  neuro eval with Dr. NIEVES  check MRI ( refused earlier, now agreed )
? etiology  neuro eval with Dr. NIEVES appreciated   MRI without abcess ( refused earlier, now agreed )
Acute Osteomyelitis, abx, follow up repeat blood cultures.   neuro eval with Dr. NIEVES appreciated   MRI without abcess ( refused earlier, now agreed )
possible Acute Osteomyelitis, abx, follow up repeat blood cultures negative  neuro eval with Dr. NIEVES appreciated   MRI without epidural abcess ( refused earlier, now agreed )
possible Acute Osteomyelitis, abx, follow up repeat blood cultures negative  neuro eval with Dr. NIEVES appreciated   MRI without epidural abscess ( refused earlier, now agreed )
no further testing recommended based on current results and clinical course. Have ordered PICC and placed script in chart  recommend ceftriaxone 2 grams IV Q-day with minimum of 6 weeks duration from date of negative blood cultures so last day 2/29  weekly labs CBC, BMP, ESR faxed to [phone]  -encouraged pt to urgently address dental issue to avoid recurrent bacteremia
no further testing recommended based on current results and clinical course. Have ordered PICC and placed script in chart  recommend ceftriaxone 2 grams IV Q-day with minimum of 6 weeks duration from date of negative blood cultures so last day 2/29  weekly labs CBC, BMP, ESR faxed to [phone]
recommend ceftriaxone 2 grams IV Q-day with minimum of 6 weeks duration from date of negative blood cultures so loast day 2/29  weekly labs CBC, BMP, ESR faxed to [phone]   also recommend  -MRI of sacral region to assess for any need for site control (epidural abscess) ordered  -TTE and then KOLBY but ine to do high quality studies in outpt setting
with localization to the back and fever in this man over the age of 52 concerns for osteo, epidural abscess and recommend continue vanco and cefepime until full ID and sensitivities available of blood isolate that is likely cause of acute process.  Will also add  follow results of imaging with further recs to follow.
with strept species repeatedly in blood, no evidence of cord compression and pain in sacral area along with elevated ESR have changed abx to ceftriaxone 2 grams IV Q-day with anticipated minimum of 6 weeks duration from date of negative blood cultures but final recs to follow and if blood cultures negative from 1/19 as of 48 hours on 1/21 then can place PICC
with strept species repeatedly in blood, no evidence of cord compression and pain in sacral area along with elevated ESR have continue ceftriaxone 2 grams IV Q-day (final recs when sensitivities available) with anticipated minimum of 6 weeks duration from date of negative blood cultures but with persistently positive blood cultures recommend  -MRI of sacral region to assess for any need for site control (epidural abscess)  -TTE and then KOLBY  -repeat blood cultures until documented clearance of bacteremia

## 2020-01-22 NOTE — PROGRESS NOTE ADULT - REASON FOR ADMISSION
back pain

## 2020-01-23 LAB
CULTURE RESULTS: SIGNIFICANT CHANGE UP
CULTURE RESULTS: SIGNIFICANT CHANGE UP
SPECIMEN SOURCE: SIGNIFICANT CHANGE UP
SPECIMEN SOURCE: SIGNIFICANT CHANGE UP

## 2021-04-05 NOTE — ED ADULT NURSE REASSESSMENT NOTE - MUSCULOSKELETAL ASSESSMENT
Pt presents to ED with c/o allergic reaction. Pt is having redness and itching around her surgical sites and pt is thinking she is allergic to the tape that was used. Pt just had deep brain stimulator placed for Parkinson's on 3/31. ABC intact. A/O x4.   
WDL

## 2021-10-19 NOTE — ED ADULT NURSE REASSESSMENT NOTE - CONDITION
Overnight pulse oximetry results on cpap placed in Nurse Practitioner \"In Box\" for review.     unchanged

## 2021-12-30 NOTE — DISCHARGE NOTE NURSING/CASE MANAGEMENT/SOCIAL WORK - NSDCVIVACCINE_GEN_ALL_CORE_FT
PHYSICAL THERAPY - DAILY TREATMENT NOTE    Patient Name: Dell Stevens        Date: 2021  : 1953   yes Patient  Verified  Visit #:     Insurance: Payor: Ursula Cranker / Plan: [place] [employer] / Product Type: HMO /      In time: 5:00 PM Out time: 6:02   Total Treatment Time: 62     Medicare/BCBS Time Tracking (below)   Total Timed Codes (min):  52   1:1 Treatment Time:  n/a     TREATMENT AREA =  Neck pain [M54.2]  Pain in right shoulder [M25.511]    SUBJECTIVE  Pain Level (on 0 to 10 scale): 0 / 10      Medication Changes/New allergies or changes in medical history, any new surgeries or procedures?    no  If yes, update Summary List   Subjective Functional Status/Changes:  []  No changes reported     Pt states he is doing ok, his wife is sick so they had to cancel their new years plans       OBJECTIVE  Modalities Rationale:     decrease inflammation and decrease pain to improve patient's ability to tolerate reaching overhead   min [] Estim, type/location:                                      []  att     []  unatt     []  w/US     []  w/ice    []  w/heat    min []  Mechanical Traction: type/lbs                   []  pro   []  sup   []  int   []  cont    []  before manual    []  after manual    min []  Ultrasound, settings/location:      min []  Iontophoresis w/ dexamethasone, location:                                               []  take home patch       []  in clinic   10 min [x]  Ice     []  Heat    location/position: To R shoulder in seated    min []  Vasopneumatic Device, press/temp:        min []  Other:    [x] Skin assessment post-treatment (if applicable):    [x]  intact    []  redness- no adverse reaction                  []redness  adverse reaction:        39 min Therapeutic Exercise:  [x]  See flow sheet    Rationale:      increase ROM and increase strength to improve the patients ability to improve functional abilities      13 min Manual Therapy: grade 3/4 posterior/inferior/Anterior GHJ mobs with strap. R shoulder PROM all planes. Rationale:      decrease pain, increase ROM, increase tissue extensibility, decrease trigger points and increase postural awareness to improve patient's ability to improve functional mobility   The manual therapy interventions were performed at a separate and distinct time from the therapeutic activities interventions. Billed With/As:   [x] TE   [] TA   [] Neuro   [] Self Care Patient Education: [x] Review HEP    [] Progressed/Changed HEP based on:   [] positioning   [] body mechanics   [] transfers   [] heat/ice application    [x] other: pt ed on being careful with scapular posterior tipping during supine shoulder flexion hang stretch; verbalized understanding       Other Objective/Functional Measures  Continued with progressions from last visit. Post Treatment Pain Level (on 0 to 10) scale:   0  / 10     ASSESSMENT  Assessment/Changes in Function:   Pt was able to tolerate progressions from last visit without complaint. Pt required min vc with familiar exercises to perform correctly, cueing provided to avoid lumbar/thoracic compensation with exercises. Manual therapy focused on IR/ER stretching today to continue improving functional ROM for improved ability to perform ADLs   [x]  See Progress Note/Recertification   Patient will continue to benefit from skilled PT services to modify and progress therapeutic interventions, address functional mobility deficits, address ROM deficits, address strength deficits, analyze and address soft tissue restrictions, analyze and cue movement patterns, analyze and modify body mechanics/ergonomics, assess and modify postural abnormalities and instruct in home and community integration to attain remaining goals. Progress toward goals / Updated goals:  New Goals to be achieved in __8-10__  treatments:  1.  Pt will demo R shoulder flexion AROM >140 degrees in order to reach overhead cabinets with ease   2. Pt will demo R shoulder GREGORIA to T1 for ease of grooming ADL's. -12/28: goal in progress; supine shoulder ER PROM 35 deg (@90 deg abd)  3. Pt will demo R shoulder ER strength to >4/5 for improved stabilization for reaching and lifting. Current: added TB ER (12/23/21)        PLAN  [x]  Upgrade activities as tolerated yes Continue plan of care   []  Discharge due to :    [x]  Other: Emily Munoz has been sent off for approval 12/22, as currently only approved for 18 visits. Awaiting confirmation.  Discussed with pt plan to cancel any sessions if insurance auth has not come through yet     Therapist: Blanca Alexandra    Date: 12/30/2021 Time: 6:35 PM      Future Appointments   Date Time Provider Narciso Hancock   12/30/2021  6:00 PM Richard Griffith [facility]   1/4/2022  5:30 PM Lani Jointer [facility]   1/6/2022  6:00 PM Lani Jointer [facility]   1/11/2022  4:45 PM Lani Jointer [facility]   1/13/2022  6:00 PM Lani Jointer [facility]   1/17/2022  6:00 PM Magda Ellington, PT [facility]   1/19/2022  6:00 PM Lori Sawyer PTA [facility]   1/25/2022  5:30 PM [employer] [facility]   1/27/2022  6:00 PM [employer] [facility] No Vaccines Administered.

## 2022-03-30 ENCOUNTER — APPOINTMENT (OUTPATIENT)
Dept: ORTHOPEDIC SURGERY | Facility: CLINIC | Age: 62
End: 2022-03-30
Payer: COMMERCIAL

## 2022-03-30 VITALS
DIASTOLIC BLOOD PRESSURE: 78 MMHG | HEIGHT: 72 IN | HEART RATE: 93 BPM | SYSTOLIC BLOOD PRESSURE: 112 MMHG | WEIGHT: 220 LBS | BODY MASS INDEX: 29.8 KG/M2

## 2022-03-30 DIAGNOSIS — Z78.9 OTHER SPECIFIED HEALTH STATUS: ICD-10-CM

## 2022-03-30 PROCEDURE — 99203 OFFICE O/P NEW LOW 30 MIN: CPT

## 2022-03-31 PROBLEM — Z78.9 CURRENT NON-SMOKER: Status: ACTIVE | Noted: 2022-03-31

## 2022-03-31 NOTE — PHYSICAL EXAM
[de-identified] : Neurologic\par Left hand\par Altered sensation radial 3.5 digits at rest\par Phalen's test with median nerve compression: Does not exacerbate symptoms\par Radial nerve motor and sensory and ulnar nerve motor and sensory are intact.\par Right hand\par Normal sensation at rest\par Phalen's test with median nerve compression: Negative\par Radial nerve motor and sensory and ulnar nerve motor and sensory are intact.\par \par Left palm\par Dupuytren's nodule smaller than right at DPC fourth ray skin fixation small cord\par Skin fixation as noted with some prominent adipose on each side.\par No palpable digital involvement of Dupuytren's.\par No pertinent MP, PIP, or DIP joint contributory findings, except some Heberden's nodes; none are clinically painful.\par Full active finger flexion/extension.\par No A1 pulley tenderness and no triggering in any finger.\par \par Right \par Right palm Dupuytren's nodule at DPC fourth ray with skin fixation, cord but no contracture\par Small third ray nodule.\par No obvious flexion contractures.\par No obvious finger involvement.\par No A1 pulley tenderness and no triggering in any finger.\par No pertinent MP, PIP, or DIP joint contributory findings, except some Heberden's nodes; none are clinically painful.\par \par Skin: Other than Dupuytren's disease, no cyanosis, clubbing, edema or rashes.\par Vascular: Radial pulses intact.\par Lymphatic: No streaking or epitrochlear adenopathy.\par The patient is awake, alert, and oriented. Affect appropriate. Cooperative.

## 2022-03-31 NOTE — DISCUSSION/SUMMARY
[FreeTextEntry1] : The patient has carpal tunnel syndrome clinically on the left with numbness and tingling and frequent exacerbations.  Patient has altered sensation in the median distribution 3.5 fingers, at rest.  Pelon Test does not exacerbate symptoms.  This may indicate a more advanced case.  Diagnostic option including nerve conduction study versus treatment option such as carpal tunnel cortisone injection discussed including pros cons risks and benefits.  After discussion the patient requested and was treated with left carpal tunnel cortisone injection.  Wrist support splint provided to be worn at night.\par Patient has infrequent symptoms that are milder in intensity on the right.  Wrist splint on the right is recommended if symptoms become more frequent or problematic.\par Patient has bilateral Dupuytren's disease without contracture.  No intervention is required.  Patient denies awareness of fibromatosis and penile or plantar areas.\par No other active hand problems identified requiring treatment.\par Prognosis is limited because of the continuous symptoms of the median distribution on the left.\par Unless the patient has dramatic improvement, he should return to discuss nerve conduction study and possible carpal tunnel surgical release.  A discussion was initiated in regards to these today.\par A lengthy and detailed discussion was held with the patient regarding analysis, treatment, and recommendations. All questions have been answered. At the conclusion the patient expressed acceptance, understanding and agreement with the plan.

## 2022-04-01 RX ORDER — TELMISARTAN AND HYDROCHLOROTHIAZIDE 40; 12.5 MG/1; MG/1
40-12.5 TABLET ORAL
Refills: 0 | Status: ACTIVE | COMMUNITY

## 2022-04-01 RX ORDER — METOPROLOL SUCCINATE 50 MG/1
50 TABLET, EXTENDED RELEASE ORAL
Refills: 0 | Status: ACTIVE | COMMUNITY

## 2022-04-01 RX ORDER — ATORVASTATIN CALCIUM 40 MG/1
40 TABLET, FILM COATED ORAL
Refills: 0 | Status: ACTIVE | COMMUNITY

## 2022-04-27 NOTE — DISCHARGE NOTE NURSING/CASE MANAGEMENT/SOCIAL WORK - NSDCPECAREGIVERED_GEN_ALL_CORE
[FreeTextEntry1] : Recent right occipital stroke as discussed above\par He has a left superior quadrantanopsia which he was not aware of\par Also has multiple old lacunar strokes\par Seen by neurovascular specialist and on Xarelto and Lipitor\par Has high-grade right internal carotid artery stenosis, not in vascular territory of a stroke.  No intervention currently felt indicated\par He has cardiac evaluation pending\par He is neurologically stable.\par \par Follow-up here in 3 months
Yes/patient given printed education material on septicemia, PICC line care

## 2022-08-23 NOTE — DIETITIAN INITIAL EVALUATION ADULT. - ADD RECOMMEND
Attending to bill
Regular diet remains appropriate at this time. Verbal/written nutrition education provided on dietary recommendations in light of new pre-diabetes. Topics discussed include carbohydrate selection, increased intake of fruits/vegetables/whole grains/lean protein, limiting concentrated sweets, and physical activity as medically feasible. Pt verbalized good understanding of topics discussed. Diabetes specialist consult pending. Bowel regimen per MD's discretion.

## 2022-11-29 NOTE — ED ADULT NURSE NOTE - CHPI ED NUR QUALITY2
aching Erivedge Counseling- I discussed with the patient the risks of Erivedge including but not limited to nausea, vomiting, diarrhea, constipation, weight loss, changes in the sense of taste, decreased appetite, muscle spasms, and hair loss.  The patient verbalized understanding of the proper use and possible adverse effects of Erivedge.  All of the patient's questions and concerns were addressed.

## 2023-01-26 ENCOUNTER — APPOINTMENT (OUTPATIENT)
Dept: ORTHOPEDIC SURGERY | Facility: CLINIC | Age: 63
End: 2023-01-26
Payer: COMMERCIAL

## 2023-01-26 VITALS
WEIGHT: 220 LBS | BODY MASS INDEX: 29.8 KG/M2 | HEIGHT: 72 IN | DIASTOLIC BLOOD PRESSURE: 71 MMHG | HEART RATE: 85 BPM | SYSTOLIC BLOOD PRESSURE: 112 MMHG

## 2023-01-26 DIAGNOSIS — M72.0 PALMAR FASCIAL FIBROMATOSIS [DUPUYTREN]: ICD-10-CM

## 2023-01-26 DIAGNOSIS — G56.02 CARPAL TUNNEL SYNDROME, LEFT UPPER LIMB: ICD-10-CM

## 2023-01-26 PROCEDURE — 99214 OFFICE O/P EST MOD 30 MIN: CPT

## 2023-01-29 PROBLEM — M72.0 DUPUYTREN'S CONTRACTURE OF LEFT HAND: Status: ACTIVE | Noted: 2022-03-30

## 2023-01-29 PROBLEM — M72.0 DUPUYTREN'S CONTRACTURE OF RIGHT HAND: Status: ACTIVE | Noted: 2022-03-30

## 2023-01-29 RX ORDER — PREGABALIN 75 MG/1
75 CAPSULE ORAL
Qty: 60 | Refills: 0 | Status: ACTIVE | COMMUNITY
Start: 2022-11-07

## 2023-01-29 RX ORDER — TELMISARTAN 40 MG/1
40 TABLET ORAL
Qty: 50 | Refills: 0 | Status: ACTIVE | COMMUNITY
Start: 2022-08-31

## 2023-01-29 NOTE — PHYSICAL EXAM
[de-identified] : Neurologic\par Left hand\par Altered sensation radial 3.5 digits at rest\par Phalen's test with median nerve compression: Does not exacerbate symptoms\par Thenar atrophy is visible\par Radial nerve motor and sensory and ulnar nerve motor and sensory are intact.\par \par Right hand\par Normal sensation at rest\par Phalen's test with median nerve compression: Negative\par Thenar motor strength and tone are good.\par Radial nerve motor and sensory and ulnar nerve motor and sensory are intact.\par \par Left palm\par Dupuytren's nodule smaller than right at DPC fourth ray skin fixation small cord\par Skin fixation as noted with some prominent adipose on each side.\par No palpable digital involvement of Dupuytren's.\par No pertinent MP, PIP, or DIP joint contributory findings, except some Heberden's nodes; none are clinically painful.\par Full active finger flexion/extension.\par No A1 pulley tenderness and no triggering in any finger.\par \par Right \par Right palm Dupuytren's nodule at DPC fourth ray with skin fixation, cord but no contracture\par Small third ray nodule.\par No obvious flexion contractures.\par No obvious finger involvement.\par No A1 pulley tenderness and no triggering in any finger.\par No pertinent MP, PIP, or DIP joint contributory findings, except some Heberden's nodes; none are clinically painful.\par \par Skin: Other than Dupuytren's disease, no cyanosis, clubbing, edema or rashes.\par Vascular: Radial pulses intact.\par Lymphatic: No streaking or epitrochlear adenopathy.\par The patient is awake, alert, and oriented. Affect appropriate. Cooperative.

## 2023-01-29 NOTE — DISCUSSION/SUMMARY
[FreeTextEntry1] : Patient has clinically severe left carpal tunnel syndrome with thenar atrophy and constant symptoms as well as nighttime and frequent exacerbations.  Following cortisone injection into the left carpal tunnel patient had moderate improvement but not complete resolution at all.  Consequently left carpal tunnel release is indicated.  Exacerbations are likely to be improved but the amount of numbness that will return and the amount of paresthesias that will remain postoperatively cannot be predicted.\par I offered the patient electrodiagnostic studies to try to assess median neuropathy and possibly to assist with prognostication.  I have explained to patient that results of nerve conduction study/EMG cannot be reliably used to provide clear-cut prognosis.  Because the patient only had partial improvement after carpal tunnel cortisone injection and because he has constant paresthesias the amount of improvement is likely to be limited.  I have attempted to make this quite clear to the patient.  Patient acknowledges these limitations.  Patient states that he is comfortable proceeding with carpal tunnel release without electrodiagnostic studies.\par Patient has mild Dupuytren's disease in both hands but does not require surgical intervention for this.\par Patient has no other active hand problem requiring treatment.\par \par Surgery, carpal tunnel release, for left carpal tunnel syndrome is indicated because of symptoms refractory to conservative treatment, interfering with sleep, and activities of daily living. Because of the duration and severity of carpal tunnel syndrome, ongoing symptoms can be expected postoperatively. While the patient may see improvement, there is no assurance of this. The possibility of little improvement or of no improvement exists. Even though it is low, the possibility of worsening exists as well. Risk of injury to the median nerve, CRPS, persistent paresthesias, wound related pain, weakness, and many other factors, reviewed and discussed.\par A lengthy and detailed discussion has been held with the patient. The surgical plan, alternative treatments, and the associated risks, complications, limitations, and expectations have been discussed with the patient. Postoperative plan, need for exercising to regain motion and function, wound care, dressing care, activities, follow up, and possible need for hand therapy have been reviewed and discussed. In addition, the expectation of postop wound related pain, wound induration, swelling, weakness of , alteration of hand and finger use and function, and palmar and forearm tenderness were reviewed. In particular, the expectation of weakness, difficulty with daily activities, and wound induration often lasting six months have been stressed. \par All questions have been answered. The patient has expressed understanding and acceptance of the above and has consented to surgery.

## 2023-01-29 NOTE — HISTORY OF PRESENT ILLNESS
[FreeTextEntry1] : The patient is 62 RHD male, [facility], seen 1 prior occasion, 3/30/2022, with 1 or more year history of numbness and tingling with frequent exacerbations on the left consistent with left carpal tunnel syndrome.  Patient was treated with left carpal tunnel Kenalog cortisone injection.  Patient with much milder Right hand numbness and tingling.\par Patient with mild bilateral Dupuytren's disease.\par Patient had been treated with right wrist de Quervain's surgery approximately 25 years ago.\par \par TODAY:\par Patient reports that after the cortisone injection into the left carpal tunnel 9 months ago it was somewhat better but incompletely.\par Symptoms have now recurred to the intensity that they had been prior to injection.\par Patient states that he has considerable pain at night.\par Patient reports that he "shakes it out" but the sensation "never returns to normal".\par Patient has noted thenar muscle atrophy on the left\par \par Patient has "very little" symptoms on the right.\par Patient reports only rare numbness or tingling on the right.\par Patient is not awakened from sleep and does not perceive notable exacerbations during the day.\par Patient states that he is able to function well on the right hand has no notable right hand complaints.\par \par Patient denies triggering.\par No other hand complaints.

## 2023-01-29 NOTE — PATIENT INSTRUCTIONS
[FreeTextEntry1] : HAND SURGERY PATIENTS\par Instructions: Trigger finger, CTR, de Quervain's, etc. \par \par 1. Maintain hand elevation for 24 to 48 hours. Try to keep your hand higher than your elbow, relative to the floor.  Elevation is one of the best ways to control pain. Swelling usually peaks during this period. After 48 hours, you do not need to maintain elevation if there is no "throbbing" when your hand is lowered. NOTE: Your hand does not have to be elevated continuously. \par \par 2. Bathing: Keep your dressing dry. You may wash exposed fingers with a washcloth. You may shower or bathe with a plastic bag protecting the dressing/cast. Once you've changed the dressing, you should wash or bathe with a disposable glove over the wound.\par \par 3. Dressing:  Start to change your dressing two days after surgery (unless told not to). Apply a 3" x 3" or 2" x 2" gauze pad secured with 2 inch generic Coban or 2" Ace bandage. Try to avoid tape, because it leaves adhesive residue on the skin.  Change bandage as needed. You can remove the bandage and use your hand for Activities of Daily Living (ADL) with no dressing, if there is no bleeding, when you are inside the house. Once you change the bandage, you must reapply a new clean dressing at least once every day.\par \par 4. Exercise: It is important to move your fingers, shoulder, and elbow to prevent stiffness. Fully opening and closing your fingers several times per day to maintain full range of motion is essential. When you can easily open and close your fingers fully without pain, you can exercise only 3 to 4 times each day, even when movement becomes easy and painless to prevent finger stiffness. You may perform simple activities under 2 to 3 pounds, e.g., holding a phone, writing, simple keyboarding, using eating utensils. You may drive if you feel fully competent and safe controling the vehicle.\par \par 5. Pain: Numbness from the nerve block (local anesthesia) usually lasts 4-8 hours, but sometimes lasts more than 24 hours. Once pain starts, take pain medicine as prescribed. Remember, elevation is one of the best ways to control pain. Pain is normal during and following exercise periods. If necessary, take the prescribed medicine. Ask if you can substitute Tylenol, Advil, or Aleve. Note: You must not take more than 4000mg of Tylenol in 24 hours.\par \par 6. Bleeding: Blood staining on your dressing is very common, as is the appearance of "black and blue on exposed fingers or arm. Black and blue discoloration is expected in and around the surgical area.\par \par 7. Swelling: Some finger swelling usually occurs. Exercises and elevation will help control swelling. Ask if you may loosen the dressing. It is important to verify that you may do this.\par \par 8. Infection: Post operative infections are rare. If you get PROGRESSIVE redness, swelling, and pain for more than 24 hour that does not respond to elevation and rest, or if you start to run a fever, call the office: [phone].\par \par 9. Call to schedule a follow up appointment, even if you read this prior to surgery. Arrange follow up approximately one week post operatively, or at the time recommended by your surgeon. \par \par Thank you.\par Manny Reynolds MD\par \par \par \par

## 2023-03-23 NOTE — PATIENT PROFILE ADULT - [facility]
Transitional Care Management Telephone Call    Today's Date: 3/23/2023      Discharge Date: 3/22/23    Discharged From: [facility]    Items for attention: per chart:  HPI     \"47 yo male transferred to [facility] ICU on 3/20/2023 for an acute confusional state associated with hallucinations and agitation. This was thought to be alcohol withdrawal and CIWA with lorazepam was initiated in he was sedated with dexmedetomidine.  Upon decreasing dexmedetomidine infusion, patient was frustrated with restraints and unwilling to answer many questions.  However, he vehemently denied recent alcohol use and any use of illicit substances.  He smokes cigarettes.  Discussion with Dr. Bowles revealed a patient described visual hallucinations which respect Shiley in character and did not evidence paranoia.  She feels hallucinations are not typical for psychosis.  Dr. Bowles further speculates that initiation of Abilify may have unmasked a partial complex seizure.  PMH: Essential hypertension, gastritis associated with alcohol.  Seafood allergy but no medication allergy.    03/20/2023 ICU.  Neurology evaluation (consult Dr. Fermin, MRI, EEG and possible LP) initiated.  03/21/2023 Additional history elements:  Carbon monoxide poisoning (2001) which likely explains bilateral globus pallidus injury evident on MRI and hallucinations.  It may also explain intolerance to Abilify and Haldol.  Alcohol consumption of  1/5 gallon every 3 days prior to admission which suggests that alcohol withdrawal was in fact the mechanism for agitated delirium prompting transfer to the ICU.\"    Care Transitions Assessment    Utilization:  Visit Hospital Last 30 Days: Unplanned inpatient admission   Perception of Change in Health Status D/C: Improving  Discharged To: Home with family care  Discharge Instructions: Understands d/c instructions  Transition of Care Information Provided:    Phone Call to Facility to Check Status:      Medications:  Prescriptions: new rx  for lamictal  IP/Amb Med List Reviewed with Patient:    Med Prescriptions Filled After D/C: Confirms all meds filled  Questions About Meds Prescribed at D/C: Denies questions    Follow-up Care:  Appointments: states \"they are going to assign me one\" (PCP)  Provider Appt Scheduled Prior to D/C: No  Provider Appt Date:      Follow-up Appt Status:      Skilled Services: No    Review of Systems:   Care Transition System Evaluation:    Fever: is not present   General Symptoms: WDL (absence of symptoms)  Neurologic/Neuromuscular Symptoms: WDL (absence of symptoms)  ENT Symptoms: WDL (absence of symptoms)  Respiratory Symptoms: WDL (absence of symptoms)     Cardiovascular Symptoms: WDL (absence of symptoms)  GI Symptoms:           Symptoms: WDL (absence of symptoms)  Skin Symptoms: WDL (absence of symptoms)     Hours of Uninterrupted Sleep:   Sleeping Behaviors:WDL (absence of symptoms)  Current Lines/Drains:No    Activities of Daily Living (global): Self-care    Patient states that he does have sufficient family support. He feels that he is able to ask for assistance when needed. He is currently homeless but is now staying with his sister.   Conversation was very brief today, but he denies questions or concerns. Explained how to contact writer as needed. Next call in one week.   (3) slightly limited

## 2024-07-09 ENCOUNTER — EMERGENCY (EMERGENCY)
Facility: HOSPITAL | Age: 64
LOS: 1 days | Discharge: HOME | End: 2024-07-09
Attending: EMERGENCY MEDICINE | Admitting: EMERGENCY MEDICINE
Payer: COMMERCIAL

## 2024-07-09 VITALS
DIASTOLIC BLOOD PRESSURE: 84 MMHG | OXYGEN SATURATION: 99 % | HEIGHT: 72 IN | WEIGHT: 210.1 LBS | HEART RATE: 79 BPM | TEMPERATURE: 98 F | RESPIRATION RATE: 17 BRPM | SYSTOLIC BLOOD PRESSURE: 131 MMHG

## 2024-07-09 LAB
ALBUMIN SERPL ELPH-MCNC: 3.8 G/DL — SIGNIFICANT CHANGE UP (ref 3.3–5)
ALP SERPL-CCNC: 90 U/L — SIGNIFICANT CHANGE UP (ref 40–120)
ALT FLD-CCNC: 29 U/L — SIGNIFICANT CHANGE UP (ref 10–45)
ANION GAP SERPL CALC-SCNC: 7 MMOL/L — SIGNIFICANT CHANGE UP (ref 5–17)
AST SERPL-CCNC: 22 U/L — SIGNIFICANT CHANGE UP (ref 10–40)
BASOPHILS # BLD AUTO: 0.03 K/UL — SIGNIFICANT CHANGE UP (ref 0–0.2)
BASOPHILS NFR BLD AUTO: 0.3 % — SIGNIFICANT CHANGE UP (ref 0–2)
BILIRUB SERPL-MCNC: 0.5 MG/DL — SIGNIFICANT CHANGE UP (ref 0.2–1.2)
BUN SERPL-MCNC: 26 MG/DL — HIGH (ref 7–23)
CALCIUM SERPL-MCNC: 9.2 MG/DL — SIGNIFICANT CHANGE UP (ref 8.4–10.5)
CHLORIDE SERPL-SCNC: 103 MMOL/L — SIGNIFICANT CHANGE UP (ref 96–108)
CO2 SERPL-SCNC: 30 MMOL/L — SIGNIFICANT CHANGE UP (ref 22–31)
CREAT SERPL-MCNC: 0.96 MG/DL — SIGNIFICANT CHANGE UP (ref 0.5–1.3)
EGFR: 89 ML/MIN/1.73M2 — SIGNIFICANT CHANGE UP
EOSINOPHIL # BLD AUTO: 0.43 K/UL — SIGNIFICANT CHANGE UP (ref 0–0.5)
EOSINOPHIL NFR BLD AUTO: 4.4 % — SIGNIFICANT CHANGE UP (ref 0–6)
GLUCOSE SERPL-MCNC: 107 MG/DL — HIGH (ref 70–99)
HCT VFR BLD CALC: 40.9 % — SIGNIFICANT CHANGE UP (ref 39–50)
HGB BLD-MCNC: 13.8 G/DL — SIGNIFICANT CHANGE UP (ref 13–17)
HIV 1 & 2 AB SERPL IA.RAPID: SIGNIFICANT CHANGE UP
IMM GRANULOCYTES NFR BLD AUTO: 0.3 % — SIGNIFICANT CHANGE UP (ref 0–0.9)
LIDOCAIN IGE QN: 36 U/L — SIGNIFICANT CHANGE UP (ref 16–77)
LYMPHOCYTES # BLD AUTO: 2.01 K/UL — SIGNIFICANT CHANGE UP (ref 1–3.3)
LYMPHOCYTES # BLD AUTO: 20.4 % — SIGNIFICANT CHANGE UP (ref 13–44)
MCHC RBC-ENTMCNC: 29.7 PG — SIGNIFICANT CHANGE UP (ref 27–34)
MCHC RBC-ENTMCNC: 33.7 GM/DL — SIGNIFICANT CHANGE UP (ref 32–36)
MCV RBC AUTO: 88 FL — SIGNIFICANT CHANGE UP (ref 80–100)
MONOCYTES # BLD AUTO: 1.15 K/UL — HIGH (ref 0–0.9)
MONOCYTES NFR BLD AUTO: 11.7 % — SIGNIFICANT CHANGE UP (ref 2–14)
NEUTROPHILS # BLD AUTO: 6.19 K/UL — SIGNIFICANT CHANGE UP (ref 1.8–7.4)
NEUTROPHILS NFR BLD AUTO: 62.9 % — SIGNIFICANT CHANGE UP (ref 43–77)
NRBC # BLD: 0 /100 WBCS — SIGNIFICANT CHANGE UP (ref 0–0)
PLATELET # BLD AUTO: 261 K/UL — SIGNIFICANT CHANGE UP (ref 150–400)
POTASSIUM SERPL-MCNC: 3.6 MMOL/L — SIGNIFICANT CHANGE UP (ref 3.5–5.3)
POTASSIUM SERPL-SCNC: 3.6 MMOL/L — SIGNIFICANT CHANGE UP (ref 3.5–5.3)
PROT SERPL-MCNC: 7.5 G/DL — SIGNIFICANT CHANGE UP (ref 6–8.3)
RBC # BLD: 4.65 M/UL — SIGNIFICANT CHANGE UP (ref 4.2–5.8)
RBC # FLD: 12.5 % — SIGNIFICANT CHANGE UP (ref 10.3–14.5)
SODIUM SERPL-SCNC: 140 MMOL/L — SIGNIFICANT CHANGE UP (ref 135–145)
WBC # BLD: 9.84 K/UL — SIGNIFICANT CHANGE UP (ref 3.8–10.5)
WBC # FLD AUTO: 9.84 K/UL — SIGNIFICANT CHANGE UP (ref 3.8–10.5)

## 2024-07-09 PROCEDURE — 99285 EMERGENCY DEPT VISIT HI MDM: CPT

## 2024-07-09 PROCEDURE — 74177 CT ABD & PELVIS W/CONTRAST: CPT | Mod: 26,MC

## 2024-07-09 RX ORDER — KETOROLAC TROMETHAMINE 30 MG/ML
30 INJECTION, SOLUTION INTRAMUSCULAR ONCE
Refills: 0 | Status: DISCONTINUED | OUTPATIENT
Start: 2024-07-09 | End: 2024-07-09

## 2024-07-09 RX ORDER — SODIUM CHLORIDE 0.9 % (FLUSH) 0.9 %
1000 SYRINGE (ML) INJECTION ONCE
Refills: 0 | Status: COMPLETED | OUTPATIENT
Start: 2024-07-09 | End: 2024-07-09

## 2024-07-09 RX ADMIN — KETOROLAC TROMETHAMINE 30 MILLIGRAM(S): 30 INJECTION, SOLUTION INTRAMUSCULAR at 22:31

## 2024-07-09 RX ADMIN — Medication 1000 MILLILITER(S): at 22:31

## 2024-07-09 RX ADMIN — KETOROLAC TROMETHAMINE 30 MILLIGRAM(S): 30 INJECTION, SOLUTION INTRAMUSCULAR at 22:46

## 2024-07-10 VITALS
OXYGEN SATURATION: 99 % | RESPIRATION RATE: 18 BRPM | SYSTOLIC BLOOD PRESSURE: 126 MMHG | HEART RATE: 72 BPM | DIASTOLIC BLOOD PRESSURE: 80 MMHG

## 2024-07-10 LAB
APPEARANCE UR: CLEAR — SIGNIFICANT CHANGE UP
BILIRUB UR-MCNC: NEGATIVE — SIGNIFICANT CHANGE UP
COLOR SPEC: YELLOW — SIGNIFICANT CHANGE UP
DIFF PNL FLD: NEGATIVE — SIGNIFICANT CHANGE UP
GLUCOSE UR QL: NEGATIVE MG/DL — SIGNIFICANT CHANGE UP
HCV AB S/CO SERPL IA: 0.06 S/CO — SIGNIFICANT CHANGE UP (ref 0–0.99)
HCV AB SERPL-IMP: SIGNIFICANT CHANGE UP
KETONES UR-MCNC: NEGATIVE MG/DL — SIGNIFICANT CHANGE UP
LEUKOCYTE ESTERASE UR-ACNC: NEGATIVE — SIGNIFICANT CHANGE UP
NITRITE UR-MCNC: NEGATIVE — SIGNIFICANT CHANGE UP
PH UR: 5.5 — SIGNIFICANT CHANGE UP (ref 5–8)
PROT UR-MCNC: NEGATIVE MG/DL — SIGNIFICANT CHANGE UP
SP GR SPEC: 1.02 — SIGNIFICANT CHANGE UP (ref 1–1.03)
UROBILINOGEN FLD QL: 1 MG/DL — SIGNIFICANT CHANGE UP (ref 0.2–1)

## 2024-07-10 PROCEDURE — 36415 COLL VENOUS BLD VENIPUNCTURE: CPT

## 2024-07-10 PROCEDURE — 80053 COMPREHEN METABOLIC PANEL: CPT

## 2024-07-10 PROCEDURE — 99284 EMERGENCY DEPT VISIT MOD MDM: CPT | Mod: 25

## 2024-07-10 PROCEDURE — 83690 ASSAY OF LIPASE: CPT

## 2024-07-10 PROCEDURE — 74177 CT ABD & PELVIS W/CONTRAST: CPT | Mod: MC

## 2024-07-10 PROCEDURE — 85025 COMPLETE CBC W/AUTO DIFF WBC: CPT

## 2024-07-10 PROCEDURE — 96374 THER/PROPH/DIAG INJ IV PUSH: CPT | Mod: XU

## 2024-07-10 RX ORDER — CIPROFLOXACIN HCL 500 MG
500 TABLET ORAL ONCE
Refills: 0 | Status: COMPLETED | OUTPATIENT
Start: 2024-07-10 | End: 2024-07-10

## 2024-07-10 RX ORDER — METRONIDAZOLE 500 MG/1
500 TABLET ORAL ONCE
Refills: 0 | Status: COMPLETED | OUTPATIENT
Start: 2024-07-10 | End: 2024-07-10

## 2024-07-10 RX ORDER — METRONIDAZOLE 500 MG/1
1 TABLET ORAL
Qty: 30 | Refills: 0
Start: 2024-07-10 | End: 2024-07-19

## 2024-07-10 RX ORDER — CIPROFLOXACIN HCL 500 MG
1 TABLET ORAL
Qty: 20 | Refills: 0
Start: 2024-07-10 | End: 2024-07-19

## 2024-07-10 RX ADMIN — Medication 500 MILLIGRAM(S): at 01:04

## 2024-07-10 RX ADMIN — METRONIDAZOLE 500 MILLIGRAM(S): 500 TABLET ORAL at 01:04

## 2025-07-10 ENCOUNTER — APPOINTMENT (OUTPATIENT)
Dept: ORTHOPEDIC SURGERY | Facility: CLINIC | Age: 65
End: 2025-07-10
Payer: COMMERCIAL

## 2025-07-10 VITALS — HEIGHT: 72 IN | WEIGHT: 220 LBS | BODY MASS INDEX: 29.8 KG/M2

## 2025-07-10 PROBLEM — G56.01 CARPAL TUNNEL SYNDROME OF RIGHT WRIST: Status: ACTIVE | Noted: 2025-07-10

## 2025-07-10 PROBLEM — M65.331 TRIGGER FINGER, RIGHT MIDDLE FINGER: Status: ACTIVE | Noted: 2025-07-10

## 2025-07-10 PROCEDURE — 99215 OFFICE O/P EST HI 40 MIN: CPT

## 2025-09-04 ENCOUNTER — NON-APPOINTMENT (OUTPATIENT)
Age: 65
End: 2025-09-04

## 2025-09-05 ENCOUNTER — OUTPATIENT (OUTPATIENT)
Dept: OUTPATIENT SERVICES | Facility: HOSPITAL | Age: 65
LOS: 1 days | End: 2025-09-05
Payer: COMMERCIAL

## 2025-09-05 VITALS
WEIGHT: 203.93 LBS | HEIGHT: 72 IN | OXYGEN SATURATION: 97 % | RESPIRATION RATE: 18 BRPM | SYSTOLIC BLOOD PRESSURE: 122 MMHG | DIASTOLIC BLOOD PRESSURE: 79 MMHG | HEART RATE: 78 BPM | TEMPERATURE: 98 F

## 2025-09-05 DIAGNOSIS — G56.01 CARPAL TUNNEL SYNDROME, RIGHT UPPER LIMB: ICD-10-CM

## 2025-09-05 DIAGNOSIS — G56.02 CARPAL TUNNEL SYNDROME, LEFT UPPER LIMB: ICD-10-CM

## 2025-09-05 DIAGNOSIS — M65.331 TRIGGER FINGER, RIGHT MIDDLE FINGER: ICD-10-CM

## 2025-09-05 DIAGNOSIS — Z01.818 ENCOUNTER FOR OTHER PREPROCEDURAL EXAMINATION: ICD-10-CM

## 2025-09-05 DIAGNOSIS — I10 ESSENTIAL (PRIMARY) HYPERTENSION: ICD-10-CM

## 2025-09-05 LAB
A1C WITH ESTIMATED AVERAGE GLUCOSE RESULT: 6.2 % — HIGH (ref 4–5.6)
ANION GAP SERPL CALC-SCNC: 13 MMOL/L — SIGNIFICANT CHANGE UP (ref 5–17)
BUN SERPL-MCNC: 15 MG/DL — SIGNIFICANT CHANGE UP (ref 7–23)
CALCIUM SERPL-MCNC: 9.7 MG/DL — SIGNIFICANT CHANGE UP (ref 8.4–10.5)
CHLORIDE SERPL-SCNC: 101 MMOL/L — SIGNIFICANT CHANGE UP (ref 96–108)
CO2 SERPL-SCNC: 23 MMOL/L — SIGNIFICANT CHANGE UP (ref 22–31)
CREAT SERPL-MCNC: 0.81 MG/DL — SIGNIFICANT CHANGE UP (ref 0.5–1.3)
EGFR: 99 ML/MIN/1.73M2 — SIGNIFICANT CHANGE UP
EGFR: 99 ML/MIN/1.73M2 — SIGNIFICANT CHANGE UP
ESTIMATED AVERAGE GLUCOSE: 131 MG/DL — HIGH (ref 68–114)
GLUCOSE SERPL-MCNC: 112 MG/DL — HIGH (ref 70–99)
HCT VFR BLD CALC: 44.3 % — SIGNIFICANT CHANGE UP (ref 39–50)
HGB BLD-MCNC: 14.6 G/DL — SIGNIFICANT CHANGE UP (ref 13–17)
MCHC RBC-ENTMCNC: 29.6 PG — SIGNIFICANT CHANGE UP (ref 27–34)
MCHC RBC-ENTMCNC: 33 G/DL — SIGNIFICANT CHANGE UP (ref 32–36)
MCV RBC AUTO: 89.9 FL — SIGNIFICANT CHANGE UP (ref 80–100)
NRBC # BLD AUTO: 0 K/UL — SIGNIFICANT CHANGE UP (ref 0–0)
NRBC # FLD: 0 K/UL — SIGNIFICANT CHANGE UP (ref 0–0)
NRBC BLD AUTO-RTO: 0 /100 WBCS — SIGNIFICANT CHANGE UP (ref 0–0)
PLATELET # BLD AUTO: 281 K/UL — SIGNIFICANT CHANGE UP (ref 150–400)
PMV BLD: 9.3 FL — SIGNIFICANT CHANGE UP (ref 7–13)
POTASSIUM SERPL-MCNC: 4.1 MMOL/L — SIGNIFICANT CHANGE UP (ref 3.5–5.3)
POTASSIUM SERPL-SCNC: 4.1 MMOL/L — SIGNIFICANT CHANGE UP (ref 3.5–5.3)
RBC # BLD: 4.93 M/UL — SIGNIFICANT CHANGE UP (ref 4.2–5.8)
RBC # FLD: 12.3 % — SIGNIFICANT CHANGE UP (ref 10.3–14.5)
SODIUM SERPL-SCNC: 137 MMOL/L — SIGNIFICANT CHANGE UP (ref 135–145)
WBC # BLD: 7.16 K/UL — SIGNIFICANT CHANGE UP (ref 3.8–10.5)
WBC # FLD AUTO: 7.16 K/UL — SIGNIFICANT CHANGE UP (ref 3.8–10.5)

## 2025-09-05 PROCEDURE — 80048 BASIC METABOLIC PNL TOTAL CA: CPT

## 2025-09-05 PROCEDURE — 85027 COMPLETE CBC AUTOMATED: CPT

## 2025-09-05 PROCEDURE — G0463: CPT

## 2025-09-05 PROCEDURE — 83036 HEMOGLOBIN GLYCOSYLATED A1C: CPT

## 2025-09-05 RX ORDER — TELMISARTAN 20 MG/1
1 TABLET ORAL
Refills: 0 | DISCHARGE

## 2025-09-26 RX ORDER — METFORMIN HYDROCHLORIDE 850 MG/1
1 TABLET ORAL
Refills: 0 | DISCHARGE

## 2025-09-26 RX ORDER — METOPROLOL SUCCINATE 50 MG/1
1 TABLET, EXTENDED RELEASE ORAL
Refills: 0 | DISCHARGE

## 2025-09-26 RX ORDER — ATORVASTATIN CALCIUM 80 MG/1
1 TABLET, FILM COATED ORAL
Refills: 0 | DISCHARGE